# Patient Record
Sex: MALE | Race: WHITE | NOT HISPANIC OR LATINO | Employment: OTHER | URBAN - METROPOLITAN AREA
[De-identification: names, ages, dates, MRNs, and addresses within clinical notes are randomized per-mention and may not be internally consistent; named-entity substitution may affect disease eponyms.]

---

## 2021-04-06 ENCOUNTER — APPOINTMENT (OUTPATIENT)
Dept: RADIOLOGY | Facility: MEDICAL CENTER | Age: 76
DRG: 246 | End: 2021-04-06
Attending: HOSPITALIST
Payer: MEDICARE

## 2021-04-06 ENCOUNTER — APPOINTMENT (OUTPATIENT)
Dept: CARDIOLOGY | Facility: MEDICAL CENTER | Age: 76
DRG: 246 | End: 2021-04-06
Attending: INTERNAL MEDICINE
Payer: MEDICARE

## 2021-04-06 ENCOUNTER — APPOINTMENT (OUTPATIENT)
Dept: RADIOLOGY | Facility: MEDICAL CENTER | Age: 76
DRG: 246 | End: 2021-04-06
Attending: INTERNAL MEDICINE
Payer: MEDICARE

## 2021-04-06 ENCOUNTER — APPOINTMENT (OUTPATIENT)
Dept: CARDIOLOGY | Facility: MEDICAL CENTER | Age: 76
DRG: 246 | End: 2021-04-06
Attending: EMERGENCY MEDICINE
Payer: MEDICARE

## 2021-04-06 ENCOUNTER — HOSPITAL ENCOUNTER (INPATIENT)
Facility: MEDICAL CENTER | Age: 76
LOS: 4 days | DRG: 246 | End: 2021-04-10
Attending: EMERGENCY MEDICINE | Admitting: INTERNAL MEDICINE
Payer: MEDICARE

## 2021-04-06 ENCOUNTER — APPOINTMENT (OUTPATIENT)
Dept: RADIOLOGY | Facility: MEDICAL CENTER | Age: 76
DRG: 246 | End: 2021-04-06
Attending: EMERGENCY MEDICINE
Payer: MEDICARE

## 2021-04-06 DIAGNOSIS — I21.4 NSTEMI (NON-ST ELEVATED MYOCARDIAL INFARCTION) (HCC): ICD-10-CM

## 2021-04-06 PROBLEM — I46.9 CARDIAC ARREST (HCC): Status: ACTIVE | Noted: 2021-04-06

## 2021-04-06 PROBLEM — I48.91 AF (ATRIAL FIBRILLATION) (HCC): Status: ACTIVE | Noted: 2021-04-06

## 2021-04-06 PROBLEM — K92.2 UPPER GASTROINTESTINAL BLEED: Status: ACTIVE | Noted: 2021-04-06

## 2021-04-06 PROBLEM — R73.9 HYPERGLYCEMIA: Status: ACTIVE | Noted: 2021-04-06

## 2021-04-06 PROBLEM — E87.6 HYPOKALEMIA: Status: ACTIVE | Noted: 2021-04-06

## 2021-04-06 PROBLEM — J96.01 ACUTE RESPIRATORY FAILURE WITH HYPOXIA (HCC): Status: ACTIVE | Noted: 2021-04-06

## 2021-04-06 LAB
ABO + RH BLD: NORMAL
ABO GROUP BLD: NORMAL
ACTION RANGE TRIGGERED IACRT: YES
ALBUMIN SERPL BCP-MCNC: 3 G/DL (ref 3.2–4.9)
ALBUMIN SERPL BCP-MCNC: 3.1 G/DL (ref 3.2–4.9)
ALBUMIN/GLOB SERPL: 1.3 G/DL
ALBUMIN/GLOB SERPL: 1.4 G/DL
ALP SERPL-CCNC: 72 U/L (ref 30–99)
ALP SERPL-CCNC: 92 U/L (ref 30–99)
ALT SERPL-CCNC: 27 U/L (ref 2–50)
ALT SERPL-CCNC: 40 U/L (ref 2–50)
ANION GAP SERPL CALC-SCNC: 18 MMOL/L (ref 7–16)
ANION GAP SERPL CALC-SCNC: 6 MMOL/L (ref 7–16)
ANION GAP SERPL CALC-SCNC: 9 MMOL/L (ref 7–16)
APTT PPP: 37.5 SEC (ref 24.7–36)
AST SERPL-CCNC: 103 U/L (ref 12–45)
AST SERPL-CCNC: 25 U/L (ref 12–45)
BASE EXCESS BLDA CALC-SCNC: -2 MMOL/L (ref -4–3)
BASE EXCESS BLDA CALC-SCNC: -3 MMOL/L (ref -4–3)
BASE EXCESS BLDA CALC-SCNC: -7 MMOL/L (ref -4–3)
BASOPHILS # BLD AUTO: 0 % (ref 0–1.8)
BASOPHILS # BLD: 0 K/UL (ref 0–0.12)
BILIRUB SERPL-MCNC: 0.3 MG/DL (ref 0.1–1.5)
BILIRUB SERPL-MCNC: 0.6 MG/DL (ref 0.1–1.5)
BLD GP AB SCN SERPL QL: NORMAL
BODY TEMPERATURE: ABNORMAL DEGREES
BREATHS SETTING VENT: 26
BUN SERPL-MCNC: 12 MG/DL (ref 8–22)
BUN SERPL-MCNC: 14 MG/DL (ref 8–22)
BUN SERPL-MCNC: 16 MG/DL (ref 8–22)
CALCIUM SERPL-MCNC: 10.2 MG/DL (ref 8.5–10.5)
CALCIUM SERPL-MCNC: 8.1 MG/DL (ref 8.5–10.5)
CALCIUM SERPL-MCNC: 8.3 MG/DL (ref 8.5–10.5)
CHLORIDE SERPL-SCNC: 102 MMOL/L (ref 96–112)
CHLORIDE SERPL-SCNC: 109 MMOL/L (ref 96–112)
CHLORIDE SERPL-SCNC: 111 MMOL/L (ref 96–112)
CHOLEST SERPL-MCNC: 130 MG/DL (ref 100–199)
CO2 BLDA-SCNC: 22 MMOL/L (ref 20–33)
CO2 SERPL-SCNC: 18 MMOL/L (ref 20–33)
CO2 SERPL-SCNC: 20 MMOL/L (ref 20–33)
CO2 SERPL-SCNC: 23 MMOL/L (ref 20–33)
CREAT SERPL-MCNC: 1.06 MG/DL (ref 0.5–1.4)
CREAT SERPL-MCNC: 1.09 MG/DL (ref 0.5–1.4)
CREAT SERPL-MCNC: 1.16 MG/DL (ref 0.5–1.4)
DELSYS IDSYS: ABNORMAL
DELSYS IDSYS: ABNORMAL
EKG IMPRESSION: NORMAL
END TIDAL CARBON DIOXIDE IECO2: 21 MMHG
END TIDAL CARBON DIOXIDE IECO2: 23 MMHG
EOSINOPHIL # BLD AUTO: 0.23 K/UL (ref 0–0.51)
EOSINOPHIL NFR BLD: 3 % (ref 0–6.9)
ERYTHROCYTE [DISTWIDTH] IN BLOOD BY AUTOMATED COUNT: 47.7 FL (ref 35.9–50)
ERYTHROCYTE [DISTWIDTH] IN BLOOD BY AUTOMATED COUNT: 50.4 FL (ref 35.9–50)
FLUAV RNA SPEC QL NAA+PROBE: NEGATIVE
FLUBV RNA SPEC QL NAA+PROBE: NEGATIVE
GLOBULIN SER CALC-MCNC: 2.1 G/DL (ref 1.9–3.5)
GLOBULIN SER CALC-MCNC: 2.4 G/DL (ref 1.9–3.5)
GLUCOSE BLD-MCNC: 113 MG/DL (ref 65–99)
GLUCOSE BLD-MCNC: 148 MG/DL (ref 65–99)
GLUCOSE SERPL-MCNC: 124 MG/DL (ref 65–99)
GLUCOSE SERPL-MCNC: 224 MG/DL (ref 65–99)
GLUCOSE SERPL-MCNC: 99 MG/DL (ref 65–99)
HCO3 BLDA-SCNC: 20.7 MMOL/L (ref 17–25)
HCO3 BLDA-SCNC: 21.1 MMOL/L (ref 17–25)
HCO3 BLDA-SCNC: 21.3 MMOL/L (ref 17–25)
HCT VFR BLD AUTO: 35.6 % (ref 42–52)
HCT VFR BLD AUTO: 45.4 % (ref 42–52)
HDLC SERPL-MCNC: 41 MG/DL
HGB BLD-MCNC: 11.7 G/DL (ref 14–18)
HGB BLD-MCNC: 12 G/DL (ref 14–18)
HGB BLD-MCNC: 14.5 G/DL (ref 14–18)
HOROWITZ INDEX BLDA+IHG-RTO: 233 MM[HG]
HOROWITZ INDEX BLDA+IHG-RTO: 327 MM[HG]
HOROWITZ INDEX BLDA+IHG-RTO: 358 MM[HG]
INR PPP: 1.35 (ref 0.87–1.13)
INST. QUALIFIED PATIENT IIQPT: YES
LACTATE BLD-SCNC: 2.2 MMOL/L (ref 0.5–2)
LACTATE BLD-SCNC: 7 MMOL/L (ref 0.5–2)
LDLC SERPL CALC-MCNC: 75 MG/DL
LV EJECT FRACT  99904: 35
LYMPHOCYTES # BLD AUTO: 4.48 K/UL (ref 1–4.8)
LYMPHOCYTES NFR BLD: 59 % (ref 22–41)
MAGNESIUM SERPL-MCNC: 2.1 MG/DL (ref 1.5–2.5)
MANUAL DIFF BLD: NORMAL
MCH RBC QN AUTO: 30.6 PG (ref 27–33)
MCH RBC QN AUTO: 30.9 PG (ref 27–33)
MCHC RBC AUTO-ENTMCNC: 31.9 G/DL (ref 33.7–35.3)
MCHC RBC AUTO-ENTMCNC: 33.7 G/DL (ref 33.7–35.3)
MCV RBC AUTO: 90.8 FL (ref 81.4–97.8)
MCV RBC AUTO: 96.8 FL (ref 81.4–97.8)
MONOCYTES # BLD AUTO: 0.15 K/UL (ref 0–0.85)
MONOCYTES NFR BLD AUTO: 2 % (ref 0–13.4)
MORPHOLOGY BLD-IMP: NORMAL
NEUTROPHILS # BLD AUTO: 2.74 K/UL (ref 1.82–7.42)
NEUTROPHILS NFR BLD: 30 % (ref 44–72)
NEUTS BAND NFR BLD MANUAL: 6 % (ref 0–10)
NRBC # BLD AUTO: 0.13 K/UL
NRBC BLD-RTO: 1.7 /100 WBC
NT-PROBNP SERPL IA-MCNC: 53 PG/ML (ref 0–125)
O2/TOTAL GAS SETTING VFR VENT: 100 %
O2/TOTAL GAS SETTING VFR VENT: 30 %
O2/TOTAL GAS SETTING VFR VENT: 60 %
PCO2 BLDA: 27.4 MMHG (ref 26–37)
PCO2 BLDA: 28.8 MMHG (ref 26–37)
PCO2 BLDA: 51.4 MMHG (ref 26–37)
PCO2 TEMP ADJ BLDA: 25.7 MMHG (ref 26–37)
PCO2 TEMP ADJ BLDA: 26.3 MMHG (ref 26–37)
PCO2 TEMP ADJ BLDA: 47.1 MMHG (ref 26–37)
PEEP END EXPIRATORY PRESSURE IPEEP: 10 CMH20
PEEP END EXPIRATORY PRESSURE IPEEP: 8 CMH20
PH BLDA: 7.21 [PH] (ref 7.4–7.5)
PH BLDA: 7.47 [PH] (ref 7.4–7.5)
PH BLDA: 7.5 [PH] (ref 7.4–7.5)
PH TEMP ADJ BLDA: 7.24 [PH] (ref 7.4–7.5)
PH TEMP ADJ BLDA: 7.5 [PH] (ref 7.4–7.5)
PH TEMP ADJ BLDA: 7.52 [PH] (ref 7.4–7.5)
PLATELET # BLD AUTO: 166 K/UL (ref 164–446)
PLATELET # BLD AUTO: 177 K/UL (ref 164–446)
PLATELET BLD QL SMEAR: NORMAL
PMV BLD AUTO: 10.7 FL (ref 9–12.9)
PMV BLD AUTO: 11.5 FL (ref 9–12.9)
PO2 BLDA: 140 MMHG (ref 64–87)
PO2 BLDA: 358 MMHG (ref 64–87)
PO2 BLDA: 98 MMHG (ref 64–87)
PO2 TEMP ADJ BLDA: 127 MMHG (ref 64–87)
PO2 TEMP ADJ BLDA: 347 MMHG (ref 64–87)
PO2 TEMP ADJ BLDA: 89 MMHG (ref 64–87)
POTASSIUM SERPL-SCNC: 3.4 MMOL/L (ref 3.6–5.5)
POTASSIUM SERPL-SCNC: 4.3 MMOL/L (ref 3.6–5.5)
POTASSIUM SERPL-SCNC: 4.4 MMOL/L (ref 3.6–5.5)
POTASSIUM SERPL-SCNC: 4.4 MMOL/L (ref 3.6–5.5)
PROT SERPL-MCNC: 5.1 G/DL (ref 6–8.2)
PROT SERPL-MCNC: 5.5 G/DL (ref 6–8.2)
PROTHROMBIN TIME: 17.1 SEC (ref 12–14.6)
RBC # BLD AUTO: 3.92 M/UL (ref 4.7–6.1)
RBC # BLD AUTO: 4.69 M/UL (ref 4.7–6.1)
RBC BLD AUTO: NORMAL
RH BLD: NORMAL
RSV RNA SPEC QL NAA+PROBE: NEGATIVE
SAO2 % BLDA: 100 % (ref 93–99)
SAO2 % BLDA: 98 % (ref 93–99)
SAO2 % BLDA: 99 % (ref 93–99)
SARS-COV+SARS-COV-2 AG RESP QL IA.RAPID: NOTDETECTED
SARS-COV-2 RNA RESP QL NAA+PROBE: NOTDETECTED
SMUDGE CELLS BLD QL SMEAR: NORMAL
SODIUM SERPL-SCNC: 138 MMOL/L (ref 135–145)
SODIUM SERPL-SCNC: 138 MMOL/L (ref 135–145)
SODIUM SERPL-SCNC: 140 MMOL/L (ref 135–145)
SPECIMEN DRAWN FROM PATIENT: ABNORMAL
SPECIMEN SOURCE: NORMAL
SPECIMEN SOURCE: NORMAL
TIDAL VOLUME IVT: 430 ML
TIDAL VOLUME IVT: 430 ML
TRIGL SERPL-MCNC: 129 MG/DL (ref 0–149)
TRIGL SERPL-MCNC: 71 MG/DL (ref 0–149)
TROPONIN T SERPL-MCNC: 22 NG/L (ref 6–19)
TROPONIN T SERPL-MCNC: 82 NG/L (ref 6–19)
TROPONIN T SERPL-MCNC: 938 NG/L (ref 6–19)
UFH PPP CHRO-ACNC: <0.1 IU/ML
WBC # BLD AUTO: 7.6 K/UL (ref 4.8–10.8)
WBC # BLD AUTO: 9 K/UL (ref 4.8–10.8)

## 2021-04-06 PROCEDURE — 93005 ELECTROCARDIOGRAM TRACING: CPT | Performed by: INTERNAL MEDICINE

## 2021-04-06 PROCEDURE — 700105 HCHG RX REV CODE 258: Performed by: INTERNAL MEDICINE

## 2021-04-06 PROCEDURE — 5A1945Z RESPIRATORY VENTILATION, 24-96 CONSECUTIVE HOURS: ICD-10-PCS | Performed by: EMERGENCY MEDICINE

## 2021-04-06 PROCEDURE — 93005 ELECTROCARDIOGRAM TRACING: CPT | Performed by: EMERGENCY MEDICINE

## 2021-04-06 PROCEDURE — 0241U HCHG SARS-COV-2 COVID-19 NFCT DS RESP RNA 4 TRGT MIC: CPT

## 2021-04-06 PROCEDURE — 700105 HCHG RX REV CODE 258: Performed by: EMERGENCY MEDICINE

## 2021-04-06 PROCEDURE — 700102 HCHG RX REV CODE 250 W/ 637 OVERRIDE(OP): Performed by: HOSPITALIST

## 2021-04-06 PROCEDURE — 84132 ASSAY OF SERUM POTASSIUM: CPT

## 2021-04-06 PROCEDURE — A9270 NON-COVERED ITEM OR SERVICE: HCPCS | Performed by: HOSPITALIST

## 2021-04-06 PROCEDURE — 700101 HCHG RX REV CODE 250

## 2021-04-06 PROCEDURE — 700102 HCHG RX REV CODE 250 W/ 637 OVERRIDE(OP): Performed by: INTERNAL MEDICINE

## 2021-04-06 PROCEDURE — 86900 BLOOD TYPING SEROLOGIC ABO: CPT

## 2021-04-06 PROCEDURE — 93010 ELECTROCARDIOGRAM REPORT: CPT | Performed by: INTERNAL MEDICINE

## 2021-04-06 PROCEDURE — 36415 COLL VENOUS BLD VENIPUNCTURE: CPT

## 2021-04-06 PROCEDURE — 85730 THROMBOPLASTIN TIME PARTIAL: CPT

## 2021-04-06 PROCEDURE — 85018 HEMOGLOBIN: CPT

## 2021-04-06 PROCEDURE — C9803 HOPD COVID-19 SPEC COLLECT: HCPCS | Performed by: EMERGENCY MEDICINE

## 2021-04-06 PROCEDURE — C1751 CATH, INF, PER/CENT/MIDLINE: HCPCS

## 2021-04-06 PROCEDURE — 99291 CRITICAL CARE FIRST HOUR: CPT | Performed by: INTERNAL MEDICINE

## 2021-04-06 PROCEDURE — 71045 X-RAY EXAM CHEST 1 VIEW: CPT

## 2021-04-06 PROCEDURE — 93010 ELECTROCARDIOGRAM REPORT: CPT | Mod: 76 | Performed by: INTERNAL MEDICINE

## 2021-04-06 PROCEDURE — 93306 TTE W/DOPPLER COMPLETE: CPT | Mod: 26 | Performed by: INTERNAL MEDICINE

## 2021-04-06 PROCEDURE — 84484 ASSAY OF TROPONIN QUANT: CPT

## 2021-04-06 PROCEDURE — 80061 LIPID PANEL: CPT

## 2021-04-06 PROCEDURE — 700117 HCHG RX CONTRAST REV CODE 255: Performed by: HOSPITALIST

## 2021-04-06 PROCEDURE — A9270 NON-COVERED ITEM OR SERVICE: HCPCS | Performed by: INTERNAL MEDICINE

## 2021-04-06 PROCEDURE — 71275 CT ANGIOGRAPHY CHEST: CPT | Mod: ME

## 2021-04-06 PROCEDURE — 96375 TX/PRO/DX INJ NEW DRUG ADDON: CPT

## 2021-04-06 PROCEDURE — 4A023N7 MEASUREMENT OF CARDIAC SAMPLING AND PRESSURE, LEFT HEART, PERCUTANEOUS APPROACH: ICD-10-PCS | Performed by: INTERNAL MEDICINE

## 2021-04-06 PROCEDURE — 302214 INTUBATION BOX: Performed by: HOSPITALIST

## 2021-04-06 PROCEDURE — 99291 CRITICAL CARE FIRST HOUR: CPT | Mod: 25 | Performed by: INTERNAL MEDICINE

## 2021-04-06 PROCEDURE — 700111 HCHG RX REV CODE 636 W/ 250 OVERRIDE (IP)

## 2021-04-06 PROCEDURE — 5A12012 PERFORMANCE OF CARDIAC OUTPUT, SINGLE, MANUAL: ICD-10-PCS | Performed by: EMERGENCY MEDICINE

## 2021-04-06 PROCEDURE — 93005 ELECTROCARDIOGRAM TRACING: CPT

## 2021-04-06 PROCEDURE — 36556 INSERT NON-TUNNEL CV CATH: CPT | Mod: RT | Performed by: INTERNAL MEDICINE

## 2021-04-06 PROCEDURE — 31500 INSERT EMERGENCY AIRWAY: CPT

## 2021-04-06 PROCEDURE — 85007 BL SMEAR W/DIFF WBC COUNT: CPT

## 2021-04-06 PROCEDURE — C9113 INJ PANTOPRAZOLE SODIUM, VIA: HCPCS | Performed by: HOSPITALIST

## 2021-04-06 PROCEDURE — 700111 HCHG RX REV CODE 636 W/ 250 OVERRIDE (IP): Performed by: EMERGENCY MEDICINE

## 2021-04-06 PROCEDURE — B240ZZ3 ULTRASONOGRAPHY OF SINGLE CORONARY ARTERY, INTRAVASCULAR: ICD-10-PCS | Performed by: INTERNAL MEDICINE

## 2021-04-06 PROCEDURE — 85520 HEPARIN ASSAY: CPT

## 2021-04-06 PROCEDURE — 770022 HCHG ROOM/CARE - ICU (200)

## 2021-04-06 PROCEDURE — 700102 HCHG RX REV CODE 250 W/ 637 OVERRIDE(OP)

## 2021-04-06 PROCEDURE — 700111 HCHG RX REV CODE 636 W/ 250 OVERRIDE (IP): Performed by: INTERNAL MEDICINE

## 2021-04-06 PROCEDURE — 83735 ASSAY OF MAGNESIUM: CPT

## 2021-04-06 PROCEDURE — A9270 NON-COVERED ITEM OR SERVICE: HCPCS

## 2021-04-06 PROCEDURE — 99291 CRITICAL CARE FIRST HOUR: CPT

## 2021-04-06 PROCEDURE — 94003 VENT MGMT INPAT SUBQ DAY: CPT

## 2021-04-06 PROCEDURE — 82803 BLOOD GASES ANY COMBINATION: CPT | Mod: 91

## 2021-04-06 PROCEDURE — 700117 HCHG RX CONTRAST REV CODE 255: Performed by: INTERNAL MEDICINE

## 2021-04-06 PROCEDURE — 85347 COAGULATION TIME ACTIVATED: CPT | Mod: 91

## 2021-04-06 PROCEDURE — 83605 ASSAY OF LACTIC ACID: CPT

## 2021-04-06 PROCEDURE — 84478 ASSAY OF TRIGLYCERIDES: CPT

## 2021-04-06 PROCEDURE — 80053 COMPREHEN METABOLIC PANEL: CPT

## 2021-04-06 PROCEDURE — 85610 PROTHROMBIN TIME: CPT

## 2021-04-06 PROCEDURE — 86901 BLOOD TYPING SEROLOGIC RH(D): CPT

## 2021-04-06 PROCEDURE — 99291 CRITICAL CARE FIRST HOUR: CPT | Performed by: HOSPITALIST

## 2021-04-06 PROCEDURE — 83880 ASSAY OF NATRIURETIC PEPTIDE: CPT

## 2021-04-06 PROCEDURE — 99153 MOD SED SAME PHYS/QHP EA: CPT

## 2021-04-06 PROCEDURE — 02HV33Z INSERTION OF INFUSION DEVICE INTO SUPERIOR VENA CAVA, PERCUTANEOUS APPROACH: ICD-10-PCS | Performed by: INTERNAL MEDICINE

## 2021-04-06 PROCEDURE — 94002 VENT MGMT INPAT INIT DAY: CPT

## 2021-04-06 PROCEDURE — 36600 WITHDRAWAL OF ARTERIAL BLOOD: CPT

## 2021-04-06 PROCEDURE — 92978 ENDOLUMINL IVUS OCT C 1ST: CPT | Mod: 26,LD | Performed by: INTERNAL MEDICINE

## 2021-04-06 PROCEDURE — 92941 PRQ TRLML REVSC TOT OCCL AMI: CPT | Mod: LD | Performed by: INTERNAL MEDICINE

## 2021-04-06 PROCEDURE — 85027 COMPLETE CBC AUTOMATED: CPT | Mod: 91

## 2021-04-06 PROCEDURE — 93306 TTE W/DOPPLER COMPLETE: CPT

## 2021-04-06 PROCEDURE — 70450 CT HEAD/BRAIN W/O DYE: CPT | Mod: MG

## 2021-04-06 PROCEDURE — 36556 INSERT NON-TUNNEL CV CATH: CPT

## 2021-04-06 PROCEDURE — 92950 HEART/LUNG RESUSCITATION CPR: CPT

## 2021-04-06 PROCEDURE — B2151ZZ FLUOROSCOPY OF LEFT HEART USING LOW OSMOLAR CONTRAST: ICD-10-PCS | Performed by: INTERNAL MEDICINE

## 2021-04-06 PROCEDURE — 94799 UNLISTED PULMONARY SVC/PX: CPT

## 2021-04-06 PROCEDURE — 99292 CRITICAL CARE ADDL 30 MIN: CPT | Mod: 25 | Performed by: INTERNAL MEDICINE

## 2021-04-06 PROCEDURE — 0BH18EZ INSERTION OF ENDOTRACHEAL AIRWAY INTO TRACHEA, VIA NATURAL OR ARTIFICIAL OPENING ENDOSCOPIC: ICD-10-PCS | Performed by: EMERGENCY MEDICINE

## 2021-04-06 PROCEDURE — 92929 PR PRQ TRLUML CORONARY STENT W/ANGIO ADDL ART/BRNCH: CPT | Mod: LD | Performed by: INTERNAL MEDICINE

## 2021-04-06 PROCEDURE — 700111 HCHG RX REV CODE 636 W/ 250 OVERRIDE (IP): Performed by: HOSPITALIST

## 2021-04-06 PROCEDURE — 99152 MOD SED SAME PHYS/QHP 5/>YRS: CPT | Performed by: INTERNAL MEDICINE

## 2021-04-06 PROCEDURE — 93458 L HRT ARTERY/VENTRICLE ANGIO: CPT | Mod: 26,59 | Performed by: INTERNAL MEDICINE

## 2021-04-06 PROCEDURE — 82962 GLUCOSE BLOOD TEST: CPT | Mod: 91

## 2021-04-06 PROCEDURE — 37195 THROMBOLYTIC THERAPY STROKE: CPT

## 2021-04-06 PROCEDURE — 96365 THER/PROPH/DIAG IV INF INIT: CPT

## 2021-04-06 PROCEDURE — 87426 SARSCOV CORONAVIRUS AG IA: CPT

## 2021-04-06 PROCEDURE — 86850 RBC ANTIBODY SCREEN: CPT

## 2021-04-06 PROCEDURE — 87040 BLOOD CULTURE FOR BACTERIA: CPT

## 2021-04-06 PROCEDURE — B2111ZZ FLUOROSCOPY OF MULTIPLE CORONARY ARTERIES USING LOW OSMOLAR CONTRAST: ICD-10-PCS | Performed by: INTERNAL MEDICINE

## 2021-04-06 PROCEDURE — 3E03317 INTRODUCTION OF OTHER THROMBOLYTIC INTO PERIPHERAL VEIN, PERCUTANEOUS APPROACH: ICD-10-PCS | Performed by: EMERGENCY MEDICINE

## 2021-04-06 PROCEDURE — 80048 BASIC METABOLIC PNL TOTAL CA: CPT

## 2021-04-06 PROCEDURE — 027135Z DILATION OF CORONARY ARTERY, TWO ARTERIES WITH TWO DRUG-ELUTING INTRALUMINAL DEVICES, PERCUTANEOUS APPROACH: ICD-10-PCS | Performed by: INTERNAL MEDICINE

## 2021-04-06 RX ORDER — CLOPIDOGREL BISULFATE 75 MG/1
75 TABLET ORAL DAILY
Status: DISCONTINUED | OUTPATIENT
Start: 2021-04-07 | End: 2021-04-07

## 2021-04-06 RX ORDER — CLOPIDOGREL BISULFATE 75 MG/1
TABLET ORAL
Status: COMPLETED
Start: 2021-04-06 | End: 2021-04-06

## 2021-04-06 RX ORDER — SODIUM CHLORIDE 9 MG/ML
INJECTION, SOLUTION INTRAVENOUS CONTINUOUS
Status: DISCONTINUED | OUTPATIENT
Start: 2021-04-06 | End: 2021-04-08

## 2021-04-06 RX ORDER — ACETAMINOPHEN 325 MG/1
650 TABLET ORAL EVERY 6 HOURS PRN
Status: DISCONTINUED | OUTPATIENT
Start: 2021-04-06 | End: 2021-04-10 | Stop reason: HOSPADM

## 2021-04-06 RX ORDER — POLYETHYLENE GLYCOL 3350 17 G/17G
1 POWDER, FOR SOLUTION ORAL
Status: DISCONTINUED | OUTPATIENT
Start: 2021-04-06 | End: 2021-04-10 | Stop reason: HOSPADM

## 2021-04-06 RX ORDER — ASPIRIN 81 MG/1
81 TABLET, CHEWABLE ORAL DAILY
Status: DISCONTINUED | OUTPATIENT
Start: 2021-04-07 | End: 2021-04-10 | Stop reason: HOSPADM

## 2021-04-06 RX ORDER — DEXTROSE MONOHYDRATE 25 G/50ML
50 INJECTION, SOLUTION INTRAVENOUS
Status: DISCONTINUED | OUTPATIENT
Start: 2021-04-06 | End: 2021-04-09

## 2021-04-06 RX ORDER — EPINEPHRINE IN SOD CHLOR,ISO 1 MG/10 ML
1 SYRINGE (ML) INTRAVENOUS ONCE
Status: COMPLETED | OUTPATIENT
Start: 2021-04-06 | End: 2021-04-06

## 2021-04-06 RX ORDER — SODIUM CHLORIDE 9 MG/ML
1000 INJECTION, SOLUTION INTRAVENOUS ONCE
Status: COMPLETED | OUTPATIENT
Start: 2021-04-06 | End: 2021-04-06

## 2021-04-06 RX ORDER — HEPARIN SODIUM 1000 [USP'U]/ML
4000 INJECTION, SOLUTION INTRAVENOUS; SUBCUTANEOUS ONCE
Status: DISCONTINUED | OUTPATIENT
Start: 2021-04-06 | End: 2021-04-06

## 2021-04-06 RX ORDER — ASPIRIN 300 MG/1
SUPPOSITORY RECTAL
Status: COMPLETED
Start: 2021-04-06 | End: 2021-04-06

## 2021-04-06 RX ORDER — ATORVASTATIN CALCIUM 40 MG/1
40 TABLET, FILM COATED ORAL EVERY EVENING
Status: DISCONTINUED | OUTPATIENT
Start: 2021-04-06 | End: 2021-04-10 | Stop reason: HOSPADM

## 2021-04-06 RX ORDER — HEPARIN SODIUM 5000 [USP'U]/100ML
0-30 INJECTION, SOLUTION INTRAVENOUS CONTINUOUS
Status: DISCONTINUED | OUTPATIENT
Start: 2021-04-06 | End: 2021-04-06

## 2021-04-06 RX ORDER — METOCLOPRAMIDE HYDROCHLORIDE 5 MG/ML
10 INJECTION INTRAMUSCULAR; INTRAVENOUS ONCE
Status: DISCONTINUED | OUTPATIENT
Start: 2021-04-06 | End: 2021-04-06

## 2021-04-06 RX ORDER — EPINEPHRINE IN SOD CHLOR,ISO 1 MG/10 ML
SYRINGE (ML) INTRAVENOUS
Status: COMPLETED
Start: 2021-04-06 | End: 2021-04-06

## 2021-04-06 RX ORDER — HEPARIN SODIUM 1000 [USP'U]/ML
INJECTION, SOLUTION INTRAVENOUS; SUBCUTANEOUS
Status: COMPLETED
Start: 2021-04-06 | End: 2021-04-06

## 2021-04-06 RX ORDER — HYDROMORPHONE HYDROCHLORIDE 1 MG/ML
.5-2 INJECTION, SOLUTION INTRAMUSCULAR; INTRAVENOUS; SUBCUTANEOUS
Status: DISCONTINUED | OUTPATIENT
Start: 2021-04-06 | End: 2021-04-08

## 2021-04-06 RX ORDER — ONDANSETRON 2 MG/ML
4 INJECTION INTRAMUSCULAR; INTRAVENOUS EVERY 4 HOURS PRN
Status: DISCONTINUED | OUTPATIENT
Start: 2021-04-06 | End: 2021-04-10 | Stop reason: HOSPADM

## 2021-04-06 RX ORDER — IPRATROPIUM BROMIDE AND ALBUTEROL SULFATE 2.5; .5 MG/3ML; MG/3ML
3 SOLUTION RESPIRATORY (INHALATION)
Status: DISCONTINUED | OUTPATIENT
Start: 2021-04-06 | End: 2021-04-10 | Stop reason: HOSPADM

## 2021-04-06 RX ORDER — MIDAZOLAM HYDROCHLORIDE 1 MG/ML
INJECTION INTRAMUSCULAR; INTRAVENOUS
Status: COMPLETED
Start: 2021-04-06 | End: 2021-04-06

## 2021-04-06 RX ORDER — DIPHENHYDRAMINE HYDROCHLORIDE 50 MG/ML
25 INJECTION INTRAMUSCULAR; INTRAVENOUS ONCE
Status: DISCONTINUED | OUTPATIENT
Start: 2021-04-06 | End: 2021-04-06

## 2021-04-06 RX ORDER — AMIODARONE HYDROCHLORIDE 150 MG/3ML
300 INJECTION, SOLUTION INTRAVENOUS ONCE
Status: COMPLETED | OUTPATIENT
Start: 2021-04-06 | End: 2021-04-06

## 2021-04-06 RX ORDER — ONDANSETRON 4 MG/1
4 TABLET, ORALLY DISINTEGRATING ORAL EVERY 4 HOURS PRN
Status: DISCONTINUED | OUTPATIENT
Start: 2021-04-06 | End: 2021-04-10 | Stop reason: HOSPADM

## 2021-04-06 RX ORDER — HEPARIN SODIUM 1000 [USP'U]/ML
2000 INJECTION, SOLUTION INTRAVENOUS; SUBCUTANEOUS PRN
Status: DISCONTINUED | OUTPATIENT
Start: 2021-04-06 | End: 2021-04-06

## 2021-04-06 RX ORDER — POTASSIUM CHLORIDE 14.9 MG/ML
20 INJECTION INTRAVENOUS ONCE
Status: COMPLETED | OUTPATIENT
Start: 2021-04-06 | End: 2021-04-06

## 2021-04-06 RX ORDER — AMOXICILLIN 250 MG
2 CAPSULE ORAL 2 TIMES DAILY
Status: DISCONTINUED | OUTPATIENT
Start: 2021-04-06 | End: 2021-04-10 | Stop reason: HOSPADM

## 2021-04-06 RX ORDER — HEPARIN SODIUM 200 [USP'U]/100ML
INJECTION, SOLUTION INTRAVENOUS
Status: COMPLETED
Start: 2021-04-06 | End: 2021-04-06

## 2021-04-06 RX ORDER — PANTOPRAZOLE SODIUM 40 MG/10ML
40 INJECTION, POWDER, LYOPHILIZED, FOR SOLUTION INTRAVENOUS 2 TIMES DAILY
Status: DISCONTINUED | OUTPATIENT
Start: 2021-04-06 | End: 2021-04-09

## 2021-04-06 RX ORDER — SODIUM CHLORIDE 9 MG/ML
2000 INJECTION, SOLUTION INTRAVENOUS ONCE
Status: COMPLETED | OUTPATIENT
Start: 2021-04-06 | End: 2021-04-06

## 2021-04-06 RX ORDER — NOREPINEPHRINE BITARTRATE 0.03 MG/ML
0-30 INJECTION, SOLUTION INTRAVENOUS CONTINUOUS
Status: DISCONTINUED | OUTPATIENT
Start: 2021-04-06 | End: 2021-04-09

## 2021-04-06 RX ORDER — VERAPAMIL HYDROCHLORIDE 2.5 MG/ML
INJECTION, SOLUTION INTRAVENOUS
Status: COMPLETED
Start: 2021-04-06 | End: 2021-04-06

## 2021-04-06 RX ORDER — BISACODYL 10 MG
10 SUPPOSITORY, RECTAL RECTAL
Status: DISCONTINUED | OUTPATIENT
Start: 2021-04-06 | End: 2021-04-10 | Stop reason: HOSPADM

## 2021-04-06 RX ORDER — LIDOCAINE HYDROCHLORIDE 20 MG/ML
INJECTION, SOLUTION INFILTRATION; PERINEURAL
Status: COMPLETED
Start: 2021-04-06 | End: 2021-04-06

## 2021-04-06 RX ORDER — SODIUM CHLORIDE 9 MG/ML
INJECTION, SOLUTION INTRAVENOUS CONTINUOUS
Status: DISCONTINUED | OUTPATIENT
Start: 2021-04-06 | End: 2021-04-06

## 2021-04-06 RX ORDER — CALCIUM CHLORIDE 100 MG/ML
1 INJECTION INTRAVENOUS; INTRAVENTRICULAR ONCE
Status: COMPLETED | OUTPATIENT
Start: 2021-04-06 | End: 2021-04-06

## 2021-04-06 RX ADMIN — LIDOCAINE HYDROCHLORIDE: 20 INJECTION, SOLUTION INFILTRATION; PERINEURAL at 15:29

## 2021-04-06 RX ADMIN — IOHEXOL 90 ML: 350 INJECTION, SOLUTION INTRAVENOUS at 10:15

## 2021-04-06 RX ADMIN — PANTOPRAZOLE SODIUM 40 MG: 40 INJECTION, POWDER, FOR SOLUTION INTRAVENOUS at 11:49

## 2021-04-06 RX ADMIN — EPINEPHRINE 1 MG: 0.1 INJECTION, SOLUTION ENDOTRACHEAL; INTRACARDIAC; INTRAVENOUS at 07:54

## 2021-04-06 RX ADMIN — HEPARIN SODIUM: 200 INJECTION, SOLUTION INTRAVENOUS at 15:15

## 2021-04-06 RX ADMIN — EPINEPHRINE 1 MG: 0.1 INJECTION, SOLUTION ENDOTRACHEAL; INTRACARDIAC; INTRAVENOUS at 07:42

## 2021-04-06 RX ADMIN — VERAPAMIL HYDROCHLORIDE 2.5 MG: 2.5 INJECTION, SOLUTION INTRAVENOUS at 15:29

## 2021-04-06 RX ADMIN — AMIODARONE HYDROCHLORIDE 1 MG/MIN: 1.8 INJECTION, SOLUTION INTRAVENOUS at 10:20

## 2021-04-06 RX ADMIN — DOCUSATE SODIUM 50 MG AND SENNOSIDES 8.6 MG 2 TABLET: 8.6; 5 TABLET, FILM COATED ORAL at 18:47

## 2021-04-06 RX ADMIN — HEPARIN SODIUM: 1000 INJECTION, SOLUTION INTRAVENOUS; SUBCUTANEOUS at 16:32

## 2021-04-06 RX ADMIN — AMIODARONE HYDROCHLORIDE 300 MG: 50 INJECTION, SOLUTION INTRAVENOUS at 07:48

## 2021-04-06 RX ADMIN — EPINEPHRINE 1 MG: 0.1 INJECTION, SOLUTION ENDOTRACHEAL; INTRACARDIAC; INTRAVENOUS at 07:58

## 2021-04-06 RX ADMIN — SODIUM CHLORIDE: 9 INJECTION, SOLUTION INTRAVENOUS at 12:50

## 2021-04-06 RX ADMIN — CLOPIDOGREL BISULFATE 75 MG: 75 TABLET ORAL at 16:34

## 2021-04-06 RX ADMIN — HUMAN ALBUMIN MICROSPHERES AND PERFLUTREN 3 ML: 10; .22 INJECTION, SOLUTION INTRAVENOUS at 09:08

## 2021-04-06 RX ADMIN — SODIUM BICARBONATE 50 MEQ: 84 INJECTION INTRAVENOUS at 08:00

## 2021-04-06 RX ADMIN — CALCIUM CHLORIDE 1 G: 100 INJECTION INTRAVENOUS; INTRAVENTRICULAR at 07:49

## 2021-04-06 RX ADMIN — NITROGLYCERIN 10 ML: 20 INJECTION INTRAVENOUS at 15:29

## 2021-04-06 RX ADMIN — SODIUM CHLORIDE 1000 ML: 9 INJECTION, SOLUTION INTRAVENOUS at 09:00

## 2021-04-06 RX ADMIN — SODIUM BICARBONATE 50 MEQ: 84 INJECTION INTRAVENOUS at 07:50

## 2021-04-06 RX ADMIN — PROPOFOL 65 MCG/KG/MIN: 10 INJECTION, EMULSION INTRAVENOUS at 12:10

## 2021-04-06 RX ADMIN — SODIUM CHLORIDE 2000 ML: 9 INJECTION, SOLUTION INTRAVENOUS at 07:45

## 2021-04-06 RX ADMIN — EPINEPHRINE 1 MG: 0.1 INJECTION, SOLUTION ENDOTRACHEAL; INTRACARDIAC; INTRAVENOUS at 07:51

## 2021-04-06 RX ADMIN — MIDAZOLAM HYDROCHLORIDE 2 MG: 1 INJECTION, SOLUTION INTRAMUSCULAR; INTRAVENOUS at 15:29

## 2021-04-06 RX ADMIN — HYDROMORPHONE HYDROCHLORIDE 1 MG: 1 INJECTION, SOLUTION INTRAMUSCULAR; INTRAVENOUS; SUBCUTANEOUS at 08:43

## 2021-04-06 RX ADMIN — PROPOFOL 10 MCG/KG/MIN: 10 INJECTION, EMULSION INTRAVENOUS at 09:11

## 2021-04-06 RX ADMIN — HEPARIN SODIUM: 1000 INJECTION, SOLUTION INTRAVENOUS; SUBCUTANEOUS at 15:25

## 2021-04-06 RX ADMIN — AMIODARONE HYDROCHLORIDE 0.5 MG/MIN: 1.8 INJECTION, SOLUTION INTRAVENOUS at 16:58

## 2021-04-06 RX ADMIN — ATORVASTATIN CALCIUM 40 MG: 40 TABLET, FILM COATED ORAL at 18:47

## 2021-04-06 RX ADMIN — PROPOFOL 50 MCG/KG/MIN: 10 INJECTION, EMULSION INTRAVENOUS at 21:08

## 2021-04-06 RX ADMIN — HYDROMORPHONE HYDROCHLORIDE 1 MG: 1 INJECTION, SOLUTION INTRAMUSCULAR; INTRAVENOUS; SUBCUTANEOUS at 10:22

## 2021-04-06 RX ADMIN — PANTOPRAZOLE SODIUM 40 MG: 40 INJECTION, POWDER, FOR SOLUTION INTRAVENOUS at 18:47

## 2021-04-06 RX ADMIN — POTASSIUM CHLORIDE 20 MEQ: 14.9 INJECTION, SOLUTION INTRAVENOUS at 12:36

## 2021-04-06 RX ADMIN — PROPOFOL 50 MCG/KG/MIN: 10 INJECTION, EMULSION INTRAVENOUS at 16:23

## 2021-04-06 ASSESSMENT — PAIN DESCRIPTION - PAIN TYPE
TYPE: ACUTE PAIN

## 2021-04-06 ASSESSMENT — FIBROSIS 4 INDEX: FIB4 SCORE: 2.09

## 2021-04-06 NOTE — ASSESSMENT & PLAN NOTE
V. fib VT arrest  Initial echo does show some wall motion abnormalities.  Repeat EKG demonstrates a resolution of the patient's.  Right bundle branch block which was noted on presentation.  CTA chest neg though this was after pt had been given tPA  L heart cath confirms L main disease now s/p PCI and CHARO

## 2021-04-06 NOTE — ED NOTES
CIC RN Haley at bedside, Report given, POC discussed.  Pt transferred to CT with 2 RN, and RT on monitor.  All belongings including wallets, clothes, and shoes transferred with pt to room.

## 2021-04-06 NOTE — H&P
Hospital Medicine History & Physical Note    Date of Service  4/6/2021    Primary Care Physician  No primary care provider on file.    Consultants  Cardiology    Code Status  Full Code    Chief Complaint  Chief Complaint   Patient presents with   • Chest Pain     intermittenly for 3 days, consistant past 3 hours.   • Shortness of Breath     for 3 hours       History of Presenting Illness  76 y.o. male who presented 4/6/2021.  History is obtained through discussion with ED personnel, and review of chart.  Patient himself is intubated and unable to provide further information.  Patient was brought in by ambulance.  Apparently had called them due to complaint of chest pain.  On scene he was ambulatory and oriented.  He was given aspirin and 1 sublingual nitro.  An EKG was done which demonstrated some ST segment depression which apparently improved after aspirin.  In the ED the patient was initially alert oriented and interactive, but once he went into the red pod, he became unresponsive and went into a V. tach followed by V. fib arrest.  Patient underwent CPR for approximately 25 minutes.  He was given multiple courses of epinephrine, 1 g of calcium, 2 courses of bicarb, amiodarone bolus, defibrillated, eventually was given TPA, with ROSC.  During the code the patient was intubated which was apparently a nontraumatic intubation.  An orogastric tube has since been placed, and he has returned bright red blood.  After ROSC, patient had systolic blood pressure around 100-1 30, this became hypotensive after initiation of propofol.  He has since been started on Levophed.    Critical care time 40 minutes.  I spent that time at the bedside and acutely worsening of the patient's VF VT arrest and stabilizing his hemodynamics prior to transfer to the ICU.    Review of Systems  Review of Systems   Unable to perform ROS: Acuity of condition       Past Medical History   has no past medical history on file.    Surgical History   has  no past surgical history on file.     Family History  family history is not on file.     Social History       Allergies  No Known Allergies    Medications  None       Physical Exam  Temp:  [36.1 °C (96.9 °F)] 36.1 °C (96.9 °F)  Pulse:  [] 93  Resp:  [14-32] 32  BP: ()/() 128/63  SpO2:  [42 %-100 %] 100 %    Physical Exam  Constitutional:       General: He is not in acute distress.     Appearance: He is well-developed. He is not diaphoretic.   HENT:      Head: Normocephalic and atraumatic.   Eyes:      Conjunctiva/sclera: Conjunctivae normal.   Neck:      Vascular: No JVD.   Cardiovascular:      Rate and Rhythm: Normal rate.      Heart sounds: Murmur present. No gallop.    Pulmonary:      Effort: Pulmonary effort is normal. No respiratory distress.      Breath sounds: No stridor. Rales present. No wheezing.   Abdominal:      Palpations: Abdomen is soft.      Tenderness: There is no abdominal tenderness. There is no guarding or rebound.   Musculoskeletal:      Right lower leg: No edema.      Left lower leg: No edema.   Skin:     General: Skin is warm and dry.      Findings: No rash.   Neurological:      Comments: Patient examined on the vent, he has not been given any sedation or paralytics.  Pupils are midpoint and reactive symmetric bilaterally.  Patient is moving all 4 extremities purposefully.  He does not attend or follow.         Laboratory:  Recent Labs     04/06/21  0759   WBC 7.6   RBC 4.69*   HEMOGLOBIN 14.5   HEMATOCRIT 45.4   MCV 96.8   MCH 30.9   MCHC 31.9*   RDW 50.4*   PLATELETCT 177   MPV 10.7     Recent Labs     04/06/21  0759   SODIUM 138   POTASSIUM 3.4*   CHLORIDE 102   CO2 18*   GLUCOSE 224*   BUN 14   CREATININE 1.16   CALCIUM 10.2     Recent Labs     04/06/21  0759   ALTSGPT 27   ASTSGOT 25   ALKPHOSPHAT 92   TBILIRUBIN 0.3   GLUCOSE 224*     Recent Labs     04/06/21  0810   APTT 37.5*   INR 1.35*     No results for input(s): NTPROBNP in the last 72 hours.  Recent Labs      04/06/21  0902   TRIGLYCERIDE 129     Recent Labs     04/06/21  0759 04/06/21  0902   TROPONINT 22* 82*       Imaging:  DX-CHEST-PORTABLE (1 VIEW)   Final Result      1.  Right internal jugular catheter has been placed and appears appropriately located      2.  No pneumothorax      3.  Pre-existing endotracheal tube and enteric catheter appear appropriately located      4.  Mild atelectasis      CT-CTA CHEST PULMONARY ARTERY W/ RECONS   Final Result      1.  Negative for pulmonary embolism      2.  Mild atelectasis      3.  endotracheal tube and enteric catheter appear appropriately located      4.  Aortic and coronary atherosclerotic plaque            CT-HEAD W/O   Final Result      1.  No acute intracranial abnormality.   2.  Mild cortical atrophy.   3.  Chronic paranasal sinus disease.      EC-ECHOCARDIOGRAM COMPLETE W/ CONT   Final Result      DX-CHEST-PORTABLE (1 VIEW)   Final Result      1.  Endotracheal tube tip 7 cm below the elijah.   2.  Orogastric tube tip at the GE junction level.   3.  No pneumonia or pneumothorax.            Assessment/Plan:  I anticipate this patient will require at least two midnights for appropriate medical management, necessitating inpatient admission.    Acute respiratory failure with hypoxia (HCC)  Assessment & Plan  Intubated for airway protection after VF VT arrest  Pulmonology following    Cardiac arrest (HCC)  Assessment & Plan  V. fib VT arrest  Based on the work-up to this point this is likely a primary cardiac event that we are still in the process of evaluating.  Initial echo does show some wall motion abnormalities.  Repeat EKG demonstrates a resolution of the patient's.  Right bundle branch block which was noted on presentation.  Giving aspirin and statin  Holding beta-blocker in the setting of hypotension  Consult cardiology  Patient is s/p TPA therefore we are not giving heparin  Follow serial troponins  CTA of the chest negative for PE  We will need further ischemic  work-up once patient is medically stabilized    Hyperglycemia  Assessment & Plan  Unknown if the patient carries a history of diabetes as we have an incomplete medical record.  Check A1c  Placed on sliding scale    Upper gastrointestinal bleed  Assessment & Plan  Etiology is uncertain.  Intubation was apparently not traumatic.  Is complicated by the fact that he was given TPA during the code.  No stigmata of liver disease, coags are unremarkable.  Serial H&H  IV Protonix      AF (atrial fibrillation) (HCC)  Assessment & Plan  Unknown if this is a new diagnosis.  [facility]  Acutely s/p TPA  Coags are unremarkable  Official interpretation of the echo pending  Checking TSH and free T4  Cardiology consulted    Hypokalemia  Assessment & Plan  Potassium of 3.4 was drawn after administration of beta adrenergic's, calcium, and bicarb.  Repeat BMP and replace as appropriate

## 2021-04-06 NOTE — ASSESSMENT & PLAN NOTE
Ventricular fibrillation cardiac arrest in ED  ROSC with CPR, epinephrine, amiodarone, calcium, bicarbonate solution  Received alteplase in ED for possible PE  Found to have CAD and underwent PCI to LAD and diagonal  Continue DAPT, statin, cardiology following, EF 35%, further follow-up for residual CAD, myocardial function

## 2021-04-06 NOTE — CARE PLAN
Problem: Communication  Goal: The ability to communicate needs accurately and effectively will improve  Outcome: PROGRESSING AS EXPECTED  Interventions: plan of care discussed, care explained to patient, patient unable to verbalize understanding at this time.      Problem: Safety  Goal: Will remain free from injury  Outcome: PROGRESSING AS EXPECTED  Interventions: patient oriented to room and surroundings, bed alarm on, restraints in place, order verified, hourly rounds in place.      Problem: Skin Integrity  Goal: Risk for impaired skin integrity will decrease  Outcome: PROGRESSING AS EXPECTED  Interventions: pillows in use for support, skin under medical devices assessed, moisture barriers in use, q 2 hour turns

## 2021-04-06 NOTE — DISCHARGE PLANNING
Care Transition Team Discharge Planning    Anticipated Discharge Disposition: TBD    Action: Per afternoon rounds w/ hospitalist, pt is now vented. Lsw updated MD per phone contact earlier this AM. ER Lsw indicates all [facility] and [facility] information was [facility]. Pt apparently has a possible sister in [facility] information collected:  pt's only contact/possible [facility] listed is Maximo Joyce [phone]     Barriers to Discharge: TBD    Plan: LSw will continue to follow and assist w/ d/c planning.

## 2021-04-06 NOTE — ASSESSMENT & PLAN NOTE
In setting of VF/VT arrest and tPA administration  Etiology is uncertain.  Intubation was apparently not traumatic.  Bleeding has apparently resolved  Cont po PPI  Is tolerating DAPT for his CAD  Follow daily CBC

## 2021-04-06 NOTE — PROGRESS NOTES
Patient transported from ED to CT to T632. patient rass 4 Medications titrated per MD Rob order. VSS. CPOT neg for pain.

## 2021-04-06 NOTE — ED PROVIDER NOTES
ED Provider Note    Scribed for Yolanda Pink M.D. by Daisy Dixon. 4/6/2021, 7:43 AM.    Primary care provider: None  Means of arrival: EMS  History obtained from: EMS  History limited by: critical and unresponsive status.    CHIEF COMPLAINT  Chief Complaint   Patient presents with    Chest Pain     intermittenly for 3 days, consistant past 3 hours.    Shortness of Breath     for 3 hours       HPI  Ian Wall is a 76 y.o. male who presents to the Emergency Department for chest pain. Patient has been having intermittent chest pain and shortness of breath for the last 3 days, with an episode of sharp and intense chest pain onset 3 hours ago. The patient was sinus rhythm for EMS and alert upon arrival. Nursing states the patient was alert when entering the room, before having an episode of sharp chest pain and becoming unresponsive and was noted to be in ventricular fibrillation.  He was treated with Nitro and  mg PTA. Patient denies history or allergies for EMS.      Further history of present illness cannot be obtained due to the patient's critical state.     REVIEW OF SYSTEMS  Pertinent positives include chest pain and shortness of breath. Pertinent negatives include no other known symptoms. All other systems reviewed and negative.     Further ROS cannot be obtained due to the patient's critical state.    PAST MEDICAL HISTORY    Patient denies pertinent past medical history    SURGICAL HISTORY  patient denies any surgical history    FAMILY HISTORY  No known family history    CURRENT MEDICATIONS  Home Medications       Reviewed by Joel Triplett (Pharmacy Tech) on 04/06/21 at 0837  Med List Status: Not Addressed     Medication Last Dose Status        Patient Chidi Taking any Medications                         ALLERGIES  Patient denies allergies    PHYSICAL EXAM  VITAL SIGNS: Pulse 91   Resp 14   SpO2 100%     Constitutional: Unresponsive   HENT: Normocephalic, Atraumatic, purplish discoloration  to upper chest and face, Bilateral external ears normal, dentures, Nose normal.   Eyes: PERRL, Conjunctiva normal.   Neck: Normal range of motion, Supple.    Cardiovascular: PEA   Thorax & Lungs: symmetrical breath sounds with bagging   Abdomen: Benign abdominal exam, no distention, no pulsatile mass  Skin: cool, pale  Extremities: pale and cool, no palpable pulse  Neurologic: No spontaneous movements  Psychiatric: Unresponsive                                                   DIAGNOSTIC STUDIES / PROCEDURES\    LABS  Results for orders placed or performed during the hospital encounter of 04/06/21   EC-ECHOCARDIOGRAM COMPLETE W/ CONT   Result Value Ref Range    Left Ventrical Ejection Fraction 35    CBC with Differential   Result Value Ref Range    WBC 7.6 4.8 - 10.8 K/uL    RBC 4.69 (L) 4.70 - 6.10 M/uL    Hemoglobin 14.5 14.0 - 18.0 g/dL    Hematocrit 45.4 42.0 - 52.0 %    MCV 96.8 81.4 - 97.8 fL    MCH 30.9 27.0 - 33.0 pg    MCHC 31.9 (L) 33.7 - 35.3 g/dL    RDW 50.4 (H) 35.9 - 50.0 fL    Platelet Count 177 164 - 446 K/uL    MPV 10.7 9.0 - 12.9 fL    Neutrophils-Polys 30.00 (L) 44.00 - 72.00 %    Lymphocytes 59.00 (H) 22.00 - 41.00 %    Monocytes 2.00 0.00 - 13.40 %    Eosinophils 3.00 0.00 - 6.90 %    Basophils 0.00 0.00 - 1.80 %    Nucleated RBC 1.70 /100 WBC    Neutrophils (Absolute) 2.74 1.82 - 7.42 K/uL    Lymphs (Absolute) 4.48 1.00 - 4.80 K/uL    Monos (Absolute) 0.15 0.00 - 0.85 K/uL    Eos (Absolute) 0.23 0.00 - 0.51 K/uL    Baso (Absolute) 0.00 0.00 - 0.12 K/uL    NRBC (Absolute) 0.13 K/uL   Complete Metabolic Panel (CMP)   Result Value Ref Range    Sodium 138 135 - 145 mmol/L    Potassium 3.4 (L) 3.6 - 5.5 mmol/L    Chloride 102 96 - 112 mmol/L    Co2 18 (L) 20 - 33 mmol/L    Anion Gap 18.0 (H) 7.0 - 16.0    Glucose 224 (H) 65 - 99 mg/dL    Bun 14 8 - 22 mg/dL    Creatinine 1.16 0.50 - 1.40 mg/dL    Calcium 10.2 8.5 - 10.5 mg/dL    AST(SGOT) 25 12 - 45 U/L    ALT(SGPT) 27 2 - 50 U/L    Alkaline  Phosphatase 92 30 - 99 U/L    Total Bilirubin 0.3 0.1 - 1.5 mg/dL    Albumin 3.1 (L) 3.2 - 4.9 g/dL    Total Protein 5.5 (L) 6.0 - 8.2 g/dL    Globulin 2.4 1.9 - 3.5 g/dL    A-G Ratio 1.3 g/dL   Troponin   Result Value Ref Range    Troponin T 22 (H) 6 - 19 ng/L   COV-2, FLU A/B, AND RSV BY PCR (2-4 HOURS CEPHEID): Collect NP swab in VTM    Specimen: Respirate   Result Value Ref Range    Influenza virus A RNA Negative Negative    Influenza virus B, PCR Negative Negative    RSV, PCR Negative Negative    SARS-CoV-2 by PCR NotDetected     SARS-CoV-2 Source NP Swab    aPTT   Result Value Ref Range    APTT 37.5 (H) 24.7 - 36.0 sec   Prothrombin Time   Result Value Ref Range    PT 17.1 (H) 12.0 - 14.6 sec    INR 1.35 (H) 0.87 - 1.13   Heparin Xa (Unfractionated)   Result Value Ref Range    Heparin Xa (UFH) <0.10 IU/mL   ESTIMATED GFR   Result Value Ref Range    GFR If African American >60 >60 mL/min/1.73 m 2    GFR If Non African American >60 >60 mL/min/1.73 m 2   COD - Adult (Type and Screen)   Result Value Ref Range    ABO Grouping Only A     Rh Grouping Only POS     Antibody Screen-Cod NEG    Magnesium   Result Value Ref Range    Magnesium 2.1 1.5 - 2.5 mg/dL   LACTIC ACID   Result Value Ref Range    Lactic Acid 7.0 (HH) 0.5 - 2.0 mmol/L   TROPONIN   Result Value Ref Range    Troponin T 82 (H) 6 - 19 ng/L   Triglyceride   Result Value Ref Range    Triglycerides 129 0 - 149 mg/dL   DIFFERENTIAL MANUAL   Result Value Ref Range    Bands-Stabs 6.00 0.00 - 10.00 %    Manual Diff Status PERFORMED    PERIPHERAL SMEAR REVIEW   Result Value Ref Range    Peripheral Smear Review see below    PLATELET ESTIMATE   Result Value Ref Range    Plt Estimation Normal    MORPHOLOGY   Result Value Ref Range    RBC Morphology Normal     Smudge Cells Many    LACTIC ACID   Result Value Ref Range    Lactic Acid 2.2 (H) 0.5 - 2.0 mmol/L   ACCU-CHEK GLUCOSE   Result Value Ref Range    Glucose - Accu-Ck 148 (H) 65 - 99 mg/dL   EKG   Result Value  Ref Range    Report       [facility] Emergency Dept.    Test Date:  2021  Pt Name:    KISHORE KENDALL                 Department: ER  MRN:        [id number]                      Room:        06  Gender:     Male                         Technician: [id number]  :        1945                   Requested By:SYEDA HERNANDEZ  Order #:    [id number]                    Reading MD:    Measurements  Intervals                                Axis  Rate:       99                           P:  ID:                                      QRS:        80  QRSD:       172                          T:          60  QT:         398  QTc:        511    Interpretive Statements  Atrial fibrillation  Right bundle branch block  ST elevation secondary to IVCD  No previous ECG available for comparison     EKG   Result Value Ref Range    Report       [facility] Emergency Dept.    Test Date:  2021  Pt Name:    KISHORE FRANKLIN                 Department: ER  MRN:        [id number]                      Room:        06  Gender:     Male                         Technician: [id number]  :        1945                   Requested By:SYEDA HERNANDEZ  Order #:    [id number]                    Reading MD:    Measurements  Intervals                                Axis  Rate:       116                          P:  ID:                                      QRS:        75  QRSD:       102                          T:          100  QT:         345  QTc:        479    Interpretive Statements  Atrial fibrillation  Borderline low voltage, extremity leads  Borderline repolarization abnormality  Borderline prolonged QT interval  Compared to ECG 2021 08:03:24  Right bundle-branch block no longer present  Intraventricular conduction delay no longer present  ST (T wave) deviation no longer present     EKG   Result Value Ref Range    Report       [facility]    Test Date:  2021  Pt Name:    KISHORE FRANKLIN                  Department: 161  MRN:        [id number]                      Room:       32  Gender:     Male                         Technician: [id number]  :        1945                   Requested By:AYESHA VENTURAOS  Order #:    [id number]                    Reading MD:    Measurements  Intervals                                Axis  Rate:       68                           P:          71  UT:         188                          QRS:        62  QRSD:       82                           T:          98  QT:         424  QTc:        451    Interpretive Statements  SINUS RHYTHM  VENTRICULAR TRIGEMINY  ANTERIOR INFARCT, AGE INDETERMINATE  Compared to ECG 2021 09:01:48  Ventricular premature complex(es) now present  Myocardial infarct finding now present  Atrial fibrillation no longer present     ISTAT ARTERIAL BLOOD GAS   Result Value Ref Range    Ph 7.214 (LL) 7.400 - 7.500    Pco2 51.4 (HH) 26.0 - 37.0 mmHg    Po2 358 (H) 64 - 87 mmHg    Tco2 22 20 - 33 mmol/L    S02 100 (H) 93 - 99 %    Hco3 20.7 17.0 - 25.0 mmol/L    BE -7 (L) -4 - 3 mmol/L    Body Temp 35.0 C degrees    O2 Therapy 100 %    iPF Ratio 358     Ph Temp Lorri 7.241 (LL) 7.400 - 7.500    Pco2 Temp Co 47.1 (H) 26.0 - 37.0 mmHg    Po2 Temp Cor 347 (H) 64 - 87 mmHg    Specimen Arterial     Action Range Triggered YES     Inst. Qualified Patient YES    Initial EKG read by me in the emergency department.  It showed evidence of atrial fibrillation.  There is evidence of a right bundle branch block.  There is some nonspecific ST and T wave changes.  No evidence of acute MI.  No PVCs seen.  All labs reviewed by me.    EKG  12 lead EKG interpreted by me as noted above.    RADIOLOGY  DX-CHEST-PORTABLE (1 VIEW)   Final Result      1.  Right internal jugular catheter has been placed and appears appropriately located      2.  No pneumothorax      3.  Pre-existing endotracheal tube and enteric catheter appear appropriately located      4.  Mild atelectasis       CT-CTA CHEST PULMONARY ARTERY W/ RECONS   Final Result      1.  Negative for pulmonary embolism      2.  Mild atelectasis      3.  endotracheal tube and enteric catheter appear appropriately located      4.  Aortic and coronary atherosclerotic plaque            CT-HEAD W/O   Final Result      1.  No acute intracranial abnormality.   2.  Mild cortical atrophy.   3.  Chronic paranasal sinus disease.      EC-ECHOCARDIOGRAM COMPLETE W/ CONT   Final Result      DX-CHEST-PORTABLE (1 VIEW)   Final Result      1.  Endotracheal tube tip 7 cm below the elijah.   2.  Orogastric tube tip at the GE junction level.   3.  No pneumonia or pneumothorax.        The radiologist's interpretation of all radiological studies have been reviewed by me.    Intubation Procedure Note    Indication: Respiratory failure    Consent: Unable to be obtained due to the emergent nature of this procedure.    Medications Used: Unable to premedicate due to the emergent nature of the procedure    Procedure: The patient was placed in the appropriate position.  Cricoid pressure was utilized.  Intubation was performed by direct laryngoscopy using a laryngoscope and a 7.5 cuffed endotracheal tube.  The cuff was then inflated and the tube was secured appropriately at a distance of 23 cm to the dental ridge.  Initial confirmation of placement included bilateral breath sounds.  A chest x-ray to verify correct placement of the tube showed appropriate tube position.    The patient tolerated the procedure well.     Complications: None    COURSE & MEDICAL DECISION MAKING  Nursing notes, VS, PMSFHx reviewed in chart.     Patient need nursing notes and code sheet for exact course of the events.  Patient presented to the emergency department with chest pain or shortness of breath.  Patient had a episode of ventricular tachycardia upon arrival and then proceeded to go into V. fib arrest.  This was witnessed and CPR was immediately started and I was called to the  room.     7:43 AM Patient seen and examined at bedside. ERP paged overhead to patient room. CPR in progress.    7:45 AM Patient is pulseless. Rhythm of V-Fib. Patient shocked with 120J. CPR resumed.    7:48 AM Patient pulseless but in shockable rhythm. Shocks with 200 J. Treated with amiodarone.    7:49 AM Patient treated with calcium chloride.    7:50 AM Intubation procedure preformed at this time (see procedure note above for details). Patient treated with bicarb.    7:51 AM Patient treated with Epi. Pulse check reveals rate of 29. Continuing CPR.     7:54 AM Pulse check. Monitor shows wide complex with a rate of 20. Continue CPR. Patient treated with Epi.     7:56 AM Pulse check shows continuing wide complex with slow rate, continue CPR    7:57 AM Patient treated with 50 mg alteplase    7:59 AM Bedside US preformed by me shows no obvious right ventricle strain and no evidence of pneumothorax.    8:01 AM Patient has a pulse of 100 bpm. Paged hospitalist, intensivist, and cardiology. Ordered Echocardiogram, chest x-ray, CBC w/ diff, CMP, troponin, and EKG.    8:20 AM I discussed the patient's case and the above findings with Dr. Celestin (Hospitalist) who agreed to evaluate patient for hospitalization. Patients care will be transferred at this time.     8:21 AM I discussed the patient's case and the above findings with Dr. Salvador Ortzi (Intensivist) who will evaluate the patient in the ED.    8:25 AM I discussed the patient's case and the above findings with Dr. Macias (Cardiology). Will repage cardiology after the patient is seen by the intensivist.    8:26 AM Patient will be treated with Fentanyl for sedation.     8:27 AM He will be treated with a second dose of Alteplase 50 mg.    8:44 AM Paged cardiology per request of Intensivist. Concerns for MI at this time.     9:08 AM I discussed the patient's case and the above findings with Dr. Lee (cardiology) who will consult on    ER course  Patient received  alteplase in the emergency department due to the fact that patient had no resolution of symptoms after epinephrine calcium and bicarb.  Patient had a bluish discoloration to the upper chest and face and there was concern for possible pulmonary embolism.  Patient was hypoxic.  After intubation patient remained hypoxic and therefore I felt thrombolytics were not contraindicated and could be possibly life-saving.  Therefore patient was given thrombolytics with return of ROSC.  Stat echo was ordered and is pending.  Patient has not received any further antiarrhythmics since the amnio given during the code for the ventricular fibrillation.  EKG done after ROSC did not show evidence of an acute MI.  There were nonspecific changes and evidence of A. fib and a right bundle branch block.  However cardiology was consulted.  Chest x-ray shows evidence of good placement of the ET tube.  Patient has been maintaining his pressures and oxygenation has been over 90%.  Therefore care will be turned over to intensivist and hospitalist.  At this point it is unclear to me what the etiology of the patient's arrest is.  However MI versus PE is likely etiology.    CRITICAL CARE  The very real possibilty of a deterioration of this patient's condition required the highest level of my preparedness for sudden, emergent intervention.  I provided critical care services, which included medication orders, frequent reevaluations of the patient's condition and response to treatment, ordering and reviewing test results, and discussing the case with various consultants.  The critical care time associated with the care of the patient was 75 minutes. Review chart for interventions. This time is exclusive of any other billable procedures.     DISPOSITION:  Patient will be hospitalized by Dr. Celestin in critical condition.     FINAL IMPRESSION  V. fib arrest  Chest pain  Shortness of breath     Daisy NGUYỄN (Scribalva), am scribing for, and in the  presence of, Yolanda Pink M.D..    Electronically signed by: Daisy Dixon (Scribe), 4/6/2021    I, Yolanda Pink M.D. personally performed the services described in this documentation, as scribed by Daisy Dixon in my presence, and it is both accurate and complete. C    The note accurately reflects work and decisions made by me.  Yolanda Pink M.D.  4/6/2021  1:34 PM

## 2021-04-06 NOTE — CONSULTS
Cardiology Initial Consultation    Date of Service  4/6/2021    Referring Physician  Primitivo White M.D.    Reason for Consultation  VF arrest    History of Presenting Illness  Ian Condon is a 76 y.o. male with no known cardiac history who presented 4/6/2021 with chest pain.  Reportedly patient had chest discomfort for 3 days prior to presentation.  Case discussed with Dr. Pink.  Shortly after arrival in the ER, patient went to VF arrest.  ROSC was achieved in 4 minutes reportedly.  Patient was given alteplase 100 mg due to concern for pulmonary embolism.  Cardiology consulted for further evaluation.    Review of Systems  Review of Systems   Unable to perform ROS: Intubated     Past medical, surgical, family and social history is unknown for this patient.  He is intubated and no family at bedside.    Past Medical History   has no past medical history on file.    Surgical History   has no past surgical history on file.    Family History  family history is not on file.    Social History       Home Medications   None       Inpatient Medications  • HYDROmorphone  0.5-2 mg Q HOUR PRN   • propofol  0-80 mcg/kg/min Continuous   • Respiratory Therapy Consult   Continuous RT   • senna-docusate  2 tablet BID    And   • polyethylene glycol/lytes  1 Packet QDAY PRN    And   • magnesium hydroxide  30 mL QDAY PRN    And   • bisacodyl  10 mg QDAY PRN   • MD Alert...Adult ICU Electrolyte Replacement per Pharmacy   PHARMACY TO DOSE   • lidocaine  1-2 mL Q30 MIN PRN   • NS   Continuous   • ipratropium-albuterol  3 mL Q2HRS PRN (RT)   • norepinephrine (Levophed) infusion  0-30 mcg/min Continuous   • acetaminophen  650 mg Q6HRS PRN   • ondansetron  4 mg Q4HRS PRN   • ondansetron  4 mg Q4HRS PRN   • amiodarone infusion  0.5-1 mg/min Continuous   • insulin regular  1-6 Units Q6HRS    And   • dextrose 50%  50 mL Q15 MIN PRN   • pantoprazole  40 mg BID   • atorvastatin  40 mg Q EVENING   • K+ Scale: Goal of 4.5  1 Each Q6HRS    • potassium chloride (KCL-CENTRAL) IV *Administer in ICU only*  20 mEq Once       Allergies  No Known Allergies    Vital signs in last 24 hours  Temp:  [36.1 °C (96.9 °F)] 36.1 °C (96.9 °F)  Pulse:  [] 93  Resp:  [14-32] 32  BP: ()/() 128/63  SpO2:  [42 %-100 %] 100 %    Physical Exam  Physical Exam   Constitutional:   Patient intubated.   HENT:   Head: Normocephalic and atraumatic.   Neck: No JVD present.   Cardiovascular: Normal rate, regular rhythm and intact distal pulses. Exam reveals no gallop and no friction rub.   No murmur heard.  Pulmonary/Chest: He has no wheezes. He has no rales.   Abdominal: Soft. He exhibits no distension.   Musculoskeletal:         General: No edema.      Cervical back: Neck supple.   Neurological:   Patient intubated   Skin: Skin is warm and dry.       Lab Review  Recent Labs     04/06/21  0759   WBC 7.6   RBC 4.69*   HEMOGLOBIN 14.5   HEMATOCRIT 45.4   PLATELETCT 177   MCV 96.8   MPV 10.7     Recent Labs     04/06/21  0759   SODIUM 138   POTASSIUM 3.4*   CHLORIDE 102   CO2 18*   GLUCOSE 224*   BUN 14   CREATININE 1.16      Lab Results   Component Value Date/Time    TROPONINT 82 (H) 04/06/2021 09:02 AM       Lab Results   Component Value Date/Time    ASTSGOT 25 04/06/2021 07:59 AM    ALTSGPT 27 04/06/2021 07:59 AM     Lab Results   Component Value Date/Time    TRIGLYCERIDE 129 04/06/2021 09:02 AM         Labs reviewed as noted above.  Lactate 7, downtrending.  Normal renal function.        Cardiac Imaging and Procedures Review  EKG performed today at 11:38 AM was personally reviewed and per my interpretation shows sinus rhythm with PVCs. Anteroseptal q-waves with ST elevation.   Initial EKG post-arrest showed atrial fibrillation.     Echocardiogram performed today was personally reviewed and per my interpretation shows mildly reduced systolic function.  Anterior wall hypokinesis.    Assessment/Plan    VF arrest:  Respiratory failure:  Severe lactic  acidosis:  Atrial fibrillation:  Upper GI bleed:  Patient status post alteplase administration for presumptive PE:    Patient with witnessed VF arrest in the ER.  He was treated with alteplase by the ER physician for presumptive pulmonary embolism.  He has not had any recurrent ventricular arrhythmias since ROSC was achieved.  However he is having a slow upper GI bleed with blood in his OG tube.  Cardiology consulted for further management.  His echocardiogram was reviewed and shows wall motion abnormalities.  Repeat EKG ordered and personally interpreted showing Q waves in the anteroseptal leads with ST elevation.  Patient had severe lactic acidosis on presentation.    Case discussed with Dr. Rosen multiple times over the day.  Since the patient received alteplase for presumed pulmonary embolism, would like to delay coronary angiography till tomorrow if possible given the risk for bleeding.  However once his EKG and echocardiogram were reviewed we discussed the case again.  We decided to proceed with a coronary angiogram in the afternoon today to minimize the risk for bleeding.    He continues to be on amiodarone for his VF arrest.  His lactic acidosis improving.  His ongoing slow GI bleed is of concern given the planned coronary angiogram and possible dual antiplatelet therapy however at this time the risks of a cardiac event/death outweigh the risks of bleeding that could happen on dual antiplatelet therapy.    In terms of atrial fibrillation, suspect this was likely transient in the setting of his arrest.  He is currently in sinus rhythm.  No anticoagulation for now given his upper GI bleed.  Will reevaluate tomorrow.    Critical care time spent over the course of the day was 50 minutes managing this patient as noted above.  This time does not overlap with any other physicians or for billable procedures.      Ching Macias MD, [facility]  Cardiologist  [facility]

## 2021-04-06 NOTE — DISCHARGE PLANNING
Medical Social Work    LSW responded to critically ill pt in [facility]. Per EMS, pt called with the complaint of chest pain, walked himself to ambulance and was stable throughout the ride. Upon arrival to the ED, pt became critically ill and CPR was started. Pt intubated in ED. Pt's name is aIn Condon (: 1945). LSW conducted NOK search, however, no results were found.     LSW found pt's [facility] card in his wallet. VA ID #[id number]. LSW called the [facility] for emergency contacts, spoke to pt's primary [facility] nurse, Mari. Mari reports pt's only contact/possible NOK listed is Melanyemanuel Isiah [phone], who apparently lives in [place]. Mari reports pt has no records of AD documents.

## 2021-04-06 NOTE — ED NOTES
"RN spoke with Lab regarding status of CBC sent at 0800.  Per Tech, having to \"plate the slide for albumin\" so it is taking longer.  Tech able to given RN some results and will post all when complete.      Hbg 14.5  Hct  45.4  WBC 7.6  Platelet 17.7    "

## 2021-04-06 NOTE — CONSULTS
PULMONARY AND CRITICAL CARE MEDICINE CONSULTATION    Date of Consultation:  4/6/2021    Requesting Physician:  Yolanda Pink MD    Consulting Physician:  Natanael Montejo MD    Reason for Consultation: Critical care management in gentleman with cardiac arrest    Chief Complaint: Respiratory failure, cardiac arrest    History of Present Illness:    I was kindly asked to see and evaluate Ian Condon, a 76 y.o. male for evaluation and management of the above problem.    The entire history is obtained from healthcare providers and the medical record as this gentleman cannot provide me with any history.  There is no past medical history available to me at this time.  I am told that this gentleman presented to the emergency department via EMS with chest pain and shortness of breath.  He was awake and alert when he was initially admitted to the ED.  Apparently he has had chest pain and shortness of breath off and on for the last 3 days.  Shortly after being admitted to the ED, he complained of sharp chest pain and became unresponsive.  He was briefly in ventricular tachycardia and then had ventricular fibrillation.  Emergent resuscitation was initiated with CPR, IV epinephrine, IV amiodarone, IV calcium and IV bicarbonate solution.  He had mottling from the mid chest superiorly and there was a concern that he may have an acute massive pulmonary embolism.  He was empirically administered 50 mg of IV alteplase.  ROSC was achieved after approximately 18 minutes.  A second 50 mg dose of IV alteplase was administered per protocol.  After ROSC, he was in atrial fibrillation.  He ultimately developed purposeful movements as he was trying to reach up for his endotracheal tube with his hands and trying to sit up.  He was subsequently sedated for his comfort.  An orogastric tube was placed and bright red blood was initially aspirated from his stomach.  His intubation was atraumatic and no blood was noticed in his  oropharynx at the time of intubation.    Medications Prior to Admission:    No current facility-administered medications on file prior to encounter.     No current outpatient medications on file prior to encounter.       Current Medications:      Current Facility-Administered Medications:   •  EPINEPHrine (Adrenalin) injection 1 mg, 1 mg, Injection, Once, Yolanda Pink M.D.  •  HYDROmorphone (Dilaudid) injection 0.5-2 mg, 0.5-2 mg, Intravenous, Q HOUR PRN, Natanael Montejo M.D., 1 mg at 04/06/21 0843  •  propofol (DIPRIVAN) injection, 0-80 mcg/kg/min, Intravenous, Continuous, Last Rate: 4.5 mL/hr at 04/06/21 0911, 10 mcg/kg/min at 04/06/21 0911 **AND** Triglycerides Starting now and then Every 3 Days, , , Every 3 Days (0300), Natanael Montejo M.D.  •  Respiratory Therapy Consult, , Nebulization, Continuous RT, Natanael Montejo M.D.  •  senna-docusate (PERICOLACE or SENOKOT S) 8.6-50 MG per tablet 2 tablet, 2 tablet, Enteral Tube, BID **AND** polyethylene glycol/lytes (MIRALAX) PACKET 1 Packet, 1 Packet, Enteral Tube, QDAY PRN **AND** magnesium hydroxide (MILK OF MAGNESIA) suspension 30 mL, 30 mL, Enteral Tube, QDAY PRN **AND** bisacodyl (DULCOLAX) suppository 10 mg, 10 mg, Rectal, QDAY PRN, Natanael Montejo M.D.  •  MD Alert...ICU Electrolyte Replacement per Pharmacy, , Other, PHARMACY TO DOSE, Natanael Montejo M.D.  •  lidocaine (XYLOCAINE) 1 % injection 1-2 mL, 1-2 mL, Tracheal Tube, Q30 MIN PRN, Natanael Montejo M.D.  •  NS infusion, , Intravenous, Continuous, Natanael Montejo M.D.  •  ipratropium-albuterol (DUONEB) nebulizer solution, 3 mL, Nebulization, Q2HRS PRN (RT), Natanael Montejo M.D.  •  norepinephrine (Levophed) infusion 8 mg/250 mL (premix), 0-30 mcg/min, Intravenous, Continuous, Natanael Montejo M.D.  •  acetaminophen (Tylenol) tablet 650 mg, 650 mg, Enteral Tube, Q6HRS PRN, Dejan Peres D.O.  •  ondansetron (ZOFRAN) syringe/vial  injection 4 mg, 4 mg, Intravenous, Q4HRS PRN, Dejan Peres D.O.  •  ondansetron (ZOFRAN ODT) dispertab 4 mg, 4 mg, Enteral Tube, Q4HRS PRN, Dejan Peres, D.O.  •  amiodarone (NEXTERONE) 360 mg/200 mL infusion, 0.5-1 mg/min, Intravenous, Continuous, Natanael Montejo M.D.  •  insulin regular (HumuLIN R,NovoLIN R) injection, 1-6 Units, Subcutaneous, Q6HRS **AND** POC Blood Glucose, , , Q6H **AND** NOTIFY MD and PharmD, , , Once **AND** [DISCONTINUED] glucose 4 g chewable tablet 16 g, 16 g, Oral, Q15 MIN PRN **AND** dextrose 50% (D50W) injection 50 mL, 50 mL, Intravenous, Q15 MIN PRN, Natanael Montejo M.D.  •  NS infusion 1,000 mL, 1,000 mL, Intravenous, Once, Yolanda Pink M.D.  •  NS infusion 2,000 mL, 2,000 mL, Intravenous, Once, Yolanda Pink M.D.  •  pantoprazole (PROTONIX) injection 40 mg, 40 mg, Intravenous, BID, Dejan Peres, D.O.  •  atorvastatin (LIPITOR) tablet 40 mg, 40 mg, Oral, Q EVENING, Dejan Peres D.O.  No current outpatient medications on file.    Allergies:    Patient has no allergy information on record.    Past Surgical History:    No past surgical history on file.    Past Medical History:    No past medical history on file.    Social History:    Social History     Socioeconomic History   • Marital status: Not on file     Spouse name: Not on file   • Number of children: Not on file   • Years of education: Not on file   • Highest education level: Not on file   Occupational History   • Not on file   Tobacco Use   • Smoking status: Not on file   Substance and Sexual Activity   • Alcohol use: Not on file   • Drug use: Not on file   • Sexual activity: Not on file   Other Topics Concern   • Not on file   Social History Narrative   • Not on file     Social Determinants of Health     Financial Resource Strain:    • Difficulty of Paying Living Expenses:    Food Insecurity:    • Worried About Running Out of Food in the Last Year:    • Ran Out of Food in  "the Last Year:    Transportation Needs:    • Lack of Transportation (Medical):    • Lack of Transportation (Non-Medical):    Physical Activity:    • Days of Exercise per Week:    • Minutes of Exercise per Session:    Stress:    • Feeling of Stress :    Social Connections:    • Frequency of Communication with Friends and Family:    • Frequency of Social Gatherings with Friends and Family:    • Attends Nondenominational Services:    • Active Member of Clubs or Organizations:    • Attends Club or Organization Meetings:    • Marital Status:    Intimate Partner Violence:    • Fear of Current or Ex-Partner:    • Emotionally Abused:    • Physically Abused:    • Sexually Abused:        Family History:    No family history on file.    Review of System:    Review of Systems   Unable to perform ROS: Acuity of condition       Physical Examination:    /111   Pulse (!) 104   Resp (!) 28   Ht 1.778 m (5' 10\")   Wt 74.8 kg (165 lb)   SpO2 99%   BMI 23.68 kg/m²   Physical Exam   Constitutional:   On ventilator   HENT:   Head: Normocephalic and atraumatic.   Right Ear: External ear normal.   Left Ear: External ear normal.   Nose: Nose normal.   Mouth/Throat: No oropharyngeal exudate.   Eyes: Pupils are equal, round, and reactive to light. Conjunctivae are normal. Right eye exhibits no discharge. Left eye exhibits no discharge.   Neck: No tracheal deviation present.   Cardiovascular: Intact distal pulses. Exam reveals no gallop.   No murmur heard.  Initially atrial fibrillation, but then sinus tachycardia   Pulmonary/Chest: He has no wheezes. He has no rales.   Abdominal: Soft. Bowel sounds are normal. He exhibits no distension. There is no abdominal tenderness. There is no rebound.   Musculoskeletal:         General: No tenderness, deformity or edema.      Cervical back: Normal range of motion and neck supple.      Comments: No clubbing or cyanosis   Neurological:   He had purposeful movements after ROSC as he was trying to reach " for his endotracheal tube and also trying to sit up   Skin: Skin is warm and dry. He is not diaphoretic.       Laboratory Data:    Recent Labs     04/06/21  0924   ISTATAPH 7.214*   ISTATAPCO2 51.4*   ISTATAPO2 358*   ISTATATCO2 22   VGMPBDP4UFH 100*   ISTATARTHCO3 20.7   ISTATARTBE -7*   ISTATTEMP 35.0 C   ISTATFIO2 100   ISTATSPEC Arterial   ISTATAPHTC 7.241*   ELHDRTBA7UT 347*         Recent Labs     04/06/21  0759   SODIUM 138   POTASSIUM 3.4*   CHLORIDE 102   CO2 18*   GLUCOSE 224*   BUN 14   CREATININE 1.16   CALCIUM 10.2                   Imaging:    I personally viewed all the available CXR and CT scan images as well as reviewed the radiology interpretation reports.    EC-ECHOCARDIOGRAM COMPLETE W/ CONT         DX-CHEST-PORTABLE (1 VIEW)   Final Result      1.  Endotracheal tube tip 7 cm below the elijah.   2.  Orogastric tube tip at the GE junction level.   3.  No pneumonia or pneumothorax.      CT-CTA CHEST PULMONARY ARTERY W/ RECONS    (Results Pending)   CT-HEAD W/O    (Results Pending)       Assessment and Plan:    Acute respiratory failure with hypoxia (HCC)  Assessment & Plan  Intubated 4/6  I have initiated all the appropriate ventilator bundles  Full vent support    Cardiac arrest (HCC)  Assessment & Plan  Ventricular fibrillation cardiac arrest in ED  ROSC with CPR, epinephrine, amiodarone, calcium, bicarbonate solution  Empirically given 100 mg of IV alteplase during resuscitation for presumed acute pulmonary embolism  Purposeful movements following ROSC - not a candidate for therapeutic hypothermia  ECG with no evidence of acute STEMI  Start amiodarone drip  Stat echocardiogram  Optimize potassium and magnesium  Cardiology consultation    Hyperglycemia  Assessment & Plan  Check glycohemoglobin  Sliding scale insulin    Upper gastrointestinal bleed  Assessment & Plan  Bloody NG aspirate - query related to alteplase  IV PPI twice daily  Trend hemoglobin    AF (atrial fibrillation) ([facility])  Assessment  & Plan  Paroxysmal atrial fibrillation in ED  Check thyroid function  Echocardiogram  Optimize potassium and magnesium    Hypokalemia  Assessment & Plan  Replete potassium    I have assessed and reassessed his respiratory status with ventilator adjustments, airway mechanics, ventilator waveforms, blood pressure, hemodynamics, cardiovascular status with titration of norepinephrine and his neurologic status.  He is at increased risk for worsening respiratory, CNS and cardiovascular system dysfunction.    High risk of deterioration and worsening vital organ dysfunction and death without the above critical care interventions.    Thank you for allowing me to participate in the care of this gentleman.  I will continue to follow him with great interest.    The patient is critically ill.  Critical care time = 120 minutes in directly providing and coordinating critical care and extensive data review.  No time overlap and excludes procedures.    Discussed with ER physician, hospitalist, RN, RT, clinical pharmacist    Natanael Montejo MD  Pulmonary and Critical Care Medicine

## 2021-04-06 NOTE — PROGRESS NOTES
Patient to cath lab 4 at 1525 with this RN, Iban RN and RT, transported via icu bed, connected to zoll monitor, gtts verified with cath lab RN

## 2021-04-06 NOTE — PROCEDURES
"CARDIAC CATHETERIZATION REPORT    Referring Provider: Ching Macias M.D.    PROCEDURE PHYSICIAN: Raffaele Rosen MD, [facility], [facility]  ASSISTANT: None    IMPRESSIONS:  1. NSTEMI due to occluded diagonal complicated by cardiogenic shock/cardiac arrest  2. Severe disease of the proximal and mid LAD  3. Moderate Circumflex disease  4. Moderate elevation in LVEDP (23 mmHg)  5. Successful PCI of the proximal through mid LAD using one CHARO, IVUS guidance  6. Successful PCI/recanalization of the diagonal using one CHARO    Recommendations:  150 cc/hr x4 hours  Usual post cath care  Dual antiplatelet therapy for 12 months  staged MPI or relook angiogram to asseess circumflex lesions    Pre-procedure diagnosis: NSTEMI, high risk, shock  Post-procedure diagnosis: Cardiogenic shock, NSTEMI    Procedure performed  Selective coronary angiography  Left heart catheterization  Percutaneous coronary intervention - Stent Placement (Diagonal- occluded, LAD)  Intravascular Ultrasound (LAD)    Conscious sedation was supervised by myself and administered by trained personnel using fentanyl and versed between 1525 and 1626. The patient tolerated sedation without complication.     Procedure Description  1. Access: 5/6 Amharic right radial artery Micropuncture technique was utilized following local anesthesia with lidocaine.  A radial cocktail containing verapamil, heparin and saline was administered in the radial artery sheath    2. Diagnostic description: The catheter was passed to the central circulation with the aide of J tipped 0.35\" wire. A 5F TIG 4.0 was used to inject the coronary circulation enter the left ventricle during invasive hemodynamic monitoring.     3. Description of Intervention: After confirming therapeutic anticoagulation intervention proceeded. A 6F EBU 3.0 guiding catheter was used. The lesion in the diagonal 1 was crossed with a 0.014\" BMW wire. The lesion was predilated with a 2.0 x12 compliant balloon, then a 2.5x12 NC " balloon at 12 [place]. Next a prowater wire was placed in the diagonal and the BMW wire repositioned to the LAD. Following wiring the LAD the patient developed anterior ST elevation. The LAD lesions were dilated with a 2.5x12 NC balloon at 12 [place]er[place]. I then deployed a 2.25x15 Jarod CHARO in the diagonal at 12 america. The LAD was re dilated with a 2.5x12 NC balloon and subsequently a 2.5x30 Jarod CHARO was deployed from proximal to mid segment of the vessel, jailing the diagonal branch. I deployed the stent at 18 america. The proximal portion was dilated with a 3.0x15 NC balloon at 12 america. 240 mcg of IC NTG was administered followed by IVUS which showed excellent stent sizing and absence of edge complication.     4. Closing: At completion of the procedure the relevant equipment was removed from the body and hemostasis achieved by Radial band    Findings   Hemodynamics:   Aorta: 89/64 mmHg  LV: 89/23 mmHg    Coronary Anatomy   Left Main: Minimal luminal irregularities   LAD: ostial 40% stenosis, proximal 90%, mid 95% stenosis. The first diagonal is 100% occluded proximally. The second diagonal is normal    LCx: 50% proximal stenosis. The OM1 is small. The OM2 and 3 arise from the terminal AV groove circumflex with a bifurcation stenosis (Med[place] 0,1,1) OM2 has a proximal 30% stenosis, OM3 has a proximal 50% stenosis.    RCA: Dominant, long proximal to mid 30% stenosis     Results of intervention to the LAD:  Pre: 95% stenosis and PATSY III flow  Post: 0% residual stenosis and PATSY III flow. No dissection or distal embolization.    Results of intervention to the diagonal 1:  Pre: 100% stenosis and PATSY I flow  Post: 0% residual stenosis and PATSY III flow. No dissection or distal embolization.    Technical Factors  1. Complications: None  2. Estimated Blood Loss: <50 cc  3. Specimens: None  4. Contrast Volume: 88 ml  5. Medications: Radial cocktail (Verapamil 2.5 mg, Nitroglycerin 100 mcg) Heparin to maintain ACT >250 Intracoronary  nitroglycerin clopidogrel 75 mg  6. Radiation (Air Kerma): 568 mGy

## 2021-04-06 NOTE — ED NOTES
V-fib arrest  0742 v-fib arrest CPR started  0745 Pulseless v-fib shock 120J  0748 300mg Amio, v-fib Shock 200J  0749 1g Calcium  0750 bradycardia +pulse HR 27.  Resumed CPR, 50 Meq Bicarb given   0751 1mg EPI  0752  + pulse HR 20.  Resumed CPR, intubated 8.0 ETT  0754  +pulse HR 20,  Resumed CPR, 1mg EPI  0755 20g L AC  0756 + pulse, HR 20, resumed CPR  0757 Alteplase 50mg given   0758 1mg EPI  0759 +Pulse HR 20, Resumed CPR  0800 50 Meq Bicarb  0801 Pulse HR 70.  ROSC.  /51  0803 HR 98  0805 EKG  0813 CXR  0827 2nd dose 50mg Alteplase

## 2021-04-06 NOTE — ED TRIAGE NOTES
"Chief Complaint   Patient presents with   • Chest Pain     intermittenly for 3 days, consistant past 3 hours.   • Shortness of Breath     for 3 hours       PT BIB [facility] from home, ambulatory from [place] [facility].  Pt given 324mg ASA and 1 Nitro tab PTA.  EMS initial 12 lead showed ST depression in 2,3,AVF. ST depression resolved after ASA and Nitro. Upon arrival Pt A&Ox3, RN beginning triage.  Pt chest pain returned 10/10, SOB and restless.  Pt then when blue in face, cationic, and ridged.  V-tach for 3-6 beats, then v-fib.    /63   Pulse 94   Temp 36.1 °C (96.9 °F) (Temporal)   Resp (!) 28   Ht 1.778 m (5' 10\")   Wt 74.8 kg (165 lb)   SpO2 100%   BMI 23.68 kg/m²     "

## 2021-04-06 NOTE — PROGRESS NOTES
Monitor summary: SR-Afib-Accelerated junctional    Ectopy: occasional trigeminal PVCs      .12/.10/.36    Intermittently went into afib or accelerated junctional, would not sustain, rate

## 2021-04-06 NOTE — PROCEDURES
Date of service:  4/6/2021    Title:  Central venous catheter placement - internal jugular vein    Indication:  Cardiac arrest    Narrative:    A time out was performed identifying the correct patient, correct procedure and correct location prior to this procedure.    The right neck was prepped with chlorhexidine and draped in the usual sterile fashion.  1% Xylocaine solution was used for topical anesthesia.  A triple lumen central venous catheter was placed into the right internal jugular vein under ultrasound guidance using the technique described by Zeke without difficulty or apparent complication.  The line was sutured into place and a sterile dressing was placed over the line.  All 3 ports flush and return venous blood easily.  The patient tolerated the procedure quite nicely.  No complications are apparent.  A STAT CXR is ordered to confirm placement.      Natanael Montejo MD  Pulmonary and Critical Care Medicine

## 2021-04-07 ENCOUNTER — APPOINTMENT (OUTPATIENT)
Dept: RADIOLOGY | Facility: MEDICAL CENTER | Age: 76
DRG: 246 | End: 2021-04-07
Attending: INTERNAL MEDICINE
Payer: MEDICARE

## 2021-04-07 LAB
ACT BLD: 252 SEC (ref 74–137)
ACT BLD: 257 SEC (ref 74–137)
ACT BLD: 274 SEC (ref 74–137)
ACTION RANGE TRIGGERED IACRT: NO
ACTION RANGE TRIGGERED IACRT: NO
ANION GAP SERPL CALC-SCNC: 8 MMOL/L (ref 7–16)
BASE EXCESS BLDA CALC-SCNC: -4 MMOL/L (ref -4–3)
BASE EXCESS BLDA CALC-SCNC: -5 MMOL/L (ref -4–3)
BASOPHILS # BLD AUTO: 0.2 % (ref 0–1.8)
BASOPHILS # BLD: 0.02 K/UL (ref 0–0.12)
BODY TEMPERATURE: ABNORMAL DEGREES
BODY TEMPERATURE: ABNORMAL DEGREES
BREATHS SETTING VENT: 20
BUN SERPL-MCNC: 12 MG/DL (ref 8–22)
CALCIUM SERPL-MCNC: 8.1 MG/DL (ref 8.5–10.5)
CHLORIDE SERPL-SCNC: 111 MMOL/L (ref 96–112)
CO2 BLDA-SCNC: 21 MMOL/L (ref 20–33)
CO2 BLDA-SCNC: 22 MMOL/L (ref 20–33)
CO2 SERPL-SCNC: 20 MMOL/L (ref 20–33)
CREAT SERPL-MCNC: 1.12 MG/DL (ref 0.5–1.4)
DELSYS IDSYS: ABNORMAL
END TIDAL CARBON DIOXIDE IECO2: 34 MMHG
EOSINOPHIL # BLD AUTO: 0.05 K/UL (ref 0–0.51)
EOSINOPHIL NFR BLD: 0.4 % (ref 0–6.9)
ERYTHROCYTE [DISTWIDTH] IN BLOOD BY AUTOMATED COUNT: 49 FL (ref 35.9–50)
EST. AVERAGE GLUCOSE BLD GHB EST-MCNC: 111 MG/DL
GLUCOSE BLD-MCNC: 103 MG/DL (ref 65–99)
GLUCOSE BLD-MCNC: 91 MG/DL (ref 65–99)
GLUCOSE BLD-MCNC: 99 MG/DL (ref 65–99)
GLUCOSE SERPL-MCNC: 108 MG/DL (ref 65–99)
HBA1C MFR BLD: 5.5 % (ref 4–5.6)
HCO3 BLDA-SCNC: 20.2 MMOL/L (ref 17–25)
HCO3 BLDA-SCNC: 21.2 MMOL/L (ref 17–25)
HCT VFR BLD AUTO: 35.8 % (ref 42–52)
HGB BLD-MCNC: 11.9 G/DL (ref 14–18)
HGB BLD-MCNC: 12.2 G/DL (ref 14–18)
HOROWITZ INDEX BLDA+IHG-RTO: 247 MM[HG]
IMM GRANULOCYTES # BLD AUTO: 0.07 K/UL (ref 0–0.11)
IMM GRANULOCYTES NFR BLD AUTO: 0.6 % (ref 0–0.9)
INST. QUALIFIED PATIENT IIQPT: YES
INST. QUALIFIED PATIENT IIQPT: YES
LYMPHOCYTES # BLD AUTO: 0.69 K/UL (ref 1–4.8)
LYMPHOCYTES NFR BLD: 6.1 % (ref 22–41)
MAGNESIUM SERPL-MCNC: 1.7 MG/DL (ref 1.5–2.5)
MCH RBC QN AUTO: 30.7 PG (ref 27–33)
MCHC RBC AUTO-ENTMCNC: 33.2 G/DL (ref 33.7–35.3)
MCV RBC AUTO: 92.5 FL (ref 81.4–97.8)
MONOCYTES # BLD AUTO: 0.8 K/UL (ref 0–0.85)
MONOCYTES NFR BLD AUTO: 7.1 % (ref 0–13.4)
NEUTROPHILS # BLD AUTO: 9.64 K/UL (ref 1.82–7.42)
NEUTROPHILS NFR BLD: 85.6 % (ref 44–72)
NRBC # BLD AUTO: 0 K/UL
NRBC BLD-RTO: 0 /100 WBC
O2/TOTAL GAS SETTING VFR VENT: 30 %
PCO2 BLDA: 35.7 MMHG (ref 26–37)
PCO2 BLDA: 39.2 MMHG (ref 26–37)
PCO2 TEMP ADJ BLDA: 36 MMHG (ref 26–37)
PEEP END EXPIRATORY PRESSURE IPEEP: 8 CMH20
PH BLDA: 7.34 [PH] (ref 7.4–7.5)
PH BLDA: 7.36 [PH] (ref 7.4–7.5)
PH TEMP ADJ BLDA: 7.36 [PH] (ref 7.4–7.5)
PHOSPHATE SERPL-MCNC: 2.6 MG/DL (ref 2.5–4.5)
PLATELET # BLD AUTO: 154 K/UL (ref 164–446)
PMV BLD AUTO: 11.5 FL (ref 9–12.9)
PO2 BLDA: 74 MMHG (ref 64–87)
PO2 BLDA: 89 MMHG (ref 64–87)
PO2 TEMP ADJ BLDA: 75 MMHG (ref 64–87)
POTASSIUM SERPL-SCNC: 3.8 MMOL/L (ref 3.6–5.5)
POTASSIUM SERPL-SCNC: 4.3 MMOL/L (ref 3.6–5.5)
RBC # BLD AUTO: 3.87 M/UL (ref 4.7–6.1)
SAO2 % BLDA: 94 % (ref 93–99)
SAO2 % BLDA: 96 % (ref 93–99)
SODIUM SERPL-SCNC: 139 MMOL/L (ref 135–145)
SPECIMEN DRAWN FROM PATIENT: ABNORMAL
SPECIMEN DRAWN FROM PATIENT: ABNORMAL
TIDAL VOLUME IVT: 440 ML
WBC # BLD AUTO: 11.3 K/UL (ref 4.8–10.8)

## 2021-04-07 PROCEDURE — 82803 BLOOD GASES ANY COMBINATION: CPT

## 2021-04-07 PROCEDURE — 83735 ASSAY OF MAGNESIUM: CPT

## 2021-04-07 PROCEDURE — 84132 ASSAY OF SERUM POTASSIUM: CPT

## 2021-04-07 PROCEDURE — 94799 UNLISTED PULMONARY SVC/PX: CPT

## 2021-04-07 PROCEDURE — 94003 VENT MGMT INPAT SUBQ DAY: CPT

## 2021-04-07 PROCEDURE — 700105 HCHG RX REV CODE 258: Performed by: INTERNAL MEDICINE

## 2021-04-07 PROCEDURE — 36600 WITHDRAWAL OF ARTERIAL BLOOD: CPT

## 2021-04-07 PROCEDURE — 99291 CRITICAL CARE FIRST HOUR: CPT | Performed by: INTERNAL MEDICINE

## 2021-04-07 PROCEDURE — 82962 GLUCOSE BLOOD TEST: CPT | Mod: 91

## 2021-04-07 PROCEDURE — 700101 HCHG RX REV CODE 250: Performed by: INTERNAL MEDICINE

## 2021-04-07 PROCEDURE — 302136 NUTRITION PUMP: Performed by: INTERNAL MEDICINE

## 2021-04-07 PROCEDURE — 71045 X-RAY EXAM CHEST 1 VIEW: CPT

## 2021-04-07 PROCEDURE — 94150 VITAL CAPACITY TEST: CPT

## 2021-04-07 PROCEDURE — 700111 HCHG RX REV CODE 636 W/ 250 OVERRIDE (IP): Performed by: HOSPITALIST

## 2021-04-07 PROCEDURE — 86140 C-REACTIVE PROTEIN: CPT

## 2021-04-07 PROCEDURE — A9270 NON-COVERED ITEM OR SERVICE: HCPCS | Performed by: INTERNAL MEDICINE

## 2021-04-07 PROCEDURE — 700111 HCHG RX REV CODE 636 W/ 250 OVERRIDE (IP): Performed by: INTERNAL MEDICINE

## 2021-04-07 PROCEDURE — 700102 HCHG RX REV CODE 250 W/ 637 OVERRIDE(OP): Performed by: INTERNAL MEDICINE

## 2021-04-07 PROCEDURE — A9270 NON-COVERED ITEM OR SERVICE: HCPCS | Performed by: HOSPITALIST

## 2021-04-07 PROCEDURE — 99233 SBSQ HOSP IP/OBS HIGH 50: CPT | Performed by: INTERNAL MEDICINE

## 2021-04-07 PROCEDURE — 770022 HCHG ROOM/CARE - ICU (200)

## 2021-04-07 PROCEDURE — 83036 HEMOGLOBIN GLYCOSYLATED A1C: CPT

## 2021-04-07 PROCEDURE — C9113 INJ PANTOPRAZOLE SODIUM, VIA: HCPCS | Performed by: HOSPITALIST

## 2021-04-07 PROCEDURE — 80048 BASIC METABOLIC PNL TOTAL CA: CPT

## 2021-04-07 PROCEDURE — 85018 HEMOGLOBIN: CPT

## 2021-04-07 PROCEDURE — 85025 COMPLETE CBC W/AUTO DIFF WBC: CPT

## 2021-04-07 PROCEDURE — 84100 ASSAY OF PHOSPHORUS: CPT

## 2021-04-07 PROCEDURE — 700102 HCHG RX REV CODE 250 W/ 637 OVERRIDE(OP): Performed by: HOSPITALIST

## 2021-04-07 RX ORDER — MAGNESIUM SULFATE HEPTAHYDRATE 40 MG/ML
2 INJECTION, SOLUTION INTRAVENOUS ONCE
Status: COMPLETED | OUTPATIENT
Start: 2021-04-07 | End: 2021-04-07

## 2021-04-07 RX ORDER — CLOPIDOGREL BISULFATE 75 MG/1
75 TABLET ORAL DAILY
Status: DISCONTINUED | OUTPATIENT
Start: 2021-04-08 | End: 2021-04-10 | Stop reason: HOSPADM

## 2021-04-07 RX ORDER — FUROSEMIDE 10 MG/ML
20 INJECTION INTRAMUSCULAR; INTRAVENOUS
Status: DISCONTINUED | OUTPATIENT
Start: 2021-04-07 | End: 2021-04-10 | Stop reason: HOSPADM

## 2021-04-07 RX ORDER — FUROSEMIDE 10 MG/ML
20 INJECTION INTRAMUSCULAR; INTRAVENOUS
Status: DISCONTINUED | OUTPATIENT
Start: 2021-04-07 | End: 2021-04-07

## 2021-04-07 RX ORDER — DEXMEDETOMIDINE HYDROCHLORIDE 4 UG/ML
0-1.5 INJECTION, SOLUTION INTRAVENOUS CONTINUOUS
Status: DISCONTINUED | OUTPATIENT
Start: 2021-04-07 | End: 2021-04-08

## 2021-04-07 RX ORDER — POTASSIUM CHLORIDE 7.45 MG/ML
10 INJECTION INTRAVENOUS ONCE
Status: COMPLETED | OUTPATIENT
Start: 2021-04-07 | End: 2021-04-07

## 2021-04-07 RX ADMIN — DEXMEDETOMIDINE 0.2 MCG/KG/HR: 200 INJECTION, SOLUTION INTRAVENOUS at 11:17

## 2021-04-07 RX ADMIN — DEXMEDETOMIDINE 0.5 MCG/KG/HR: 200 INJECTION, SOLUTION INTRAVENOUS at 22:06

## 2021-04-07 RX ADMIN — CLOPIDOGREL BISULFATE 75 MG: 75 TABLET ORAL at 06:10

## 2021-04-07 RX ADMIN — POTASSIUM BICARBONATE 25 MEQ: 978 TABLET, EFFERVESCENT ORAL at 11:17

## 2021-04-07 RX ADMIN — ASPIRIN 81 MG: 81 TABLET, CHEWABLE ORAL at 05:03

## 2021-04-07 RX ADMIN — POTASSIUM CHLORIDE 10 MEQ: 10 INJECTION, SOLUTION INTRAVENOUS at 18:09

## 2021-04-07 RX ADMIN — FUROSEMIDE 20 MG: 10 INJECTION, SOLUTION INTRAMUSCULAR; INTRAVENOUS at 18:09

## 2021-04-07 RX ADMIN — AMIODARONE HYDROCHLORIDE 0.5 MG/MIN: 1.8 INJECTION, SOLUTION INTRAVENOUS at 04:33

## 2021-04-07 RX ADMIN — PROPOFOL 40 MCG/KG/MIN: 10 INJECTION, EMULSION INTRAVENOUS at 08:26

## 2021-04-07 RX ADMIN — NOREPINEPHRINE BITARTRATE 4 MCG/MIN: 1 INJECTION INTRAVENOUS at 14:50

## 2021-04-07 RX ADMIN — AMIODARONE HYDROCHLORIDE 0.5 MG/MIN: 1.8 INJECTION, SOLUTION INTRAVENOUS at 11:17

## 2021-04-07 RX ADMIN — ATORVASTATIN CALCIUM 40 MG: 40 TABLET, FILM COATED ORAL at 18:09

## 2021-04-07 RX ADMIN — PANTOPRAZOLE SODIUM 40 MG: 40 INJECTION, POWDER, FOR SOLUTION INTRAVENOUS at 18:09

## 2021-04-07 RX ADMIN — FUROSEMIDE 20 MG: 10 INJECTION, SOLUTION INTRAMUSCULAR; INTRAVENOUS at 11:16

## 2021-04-07 RX ADMIN — PROPOFOL 30 MCG/KG/MIN: 10 INJECTION, EMULSION INTRAVENOUS at 01:46

## 2021-04-07 RX ADMIN — MAGNESIUM SULFATE 2 G: 2 INJECTION INTRAVENOUS at 08:13

## 2021-04-07 RX ADMIN — DOCUSATE SODIUM 50 MG AND SENNOSIDES 8.6 MG 2 TABLET: 8.6; 5 TABLET, FILM COATED ORAL at 05:03

## 2021-04-07 RX ADMIN — PANTOPRAZOLE SODIUM 40 MG: 40 INJECTION, POWDER, FOR SOLUTION INTRAVENOUS at 05:04

## 2021-04-07 ASSESSMENT — COGNITIVE AND FUNCTIONAL STATUS - GENERAL
DRESSING REGULAR UPPER BODY CLOTHING: TOTAL
CLIMB 3 TO 5 STEPS WITH RAILING: TOTAL
MOBILITY SCORE: 11
STANDING UP FROM CHAIR USING ARMS: A LOT
MOVING FROM LYING ON BACK TO SITTING ON SIDE OF FLAT BED: A LOT
SUGGESTED CMS G CODE MODIFIER MOBILITY: CL
HELP NEEDED FOR BATHING: TOTAL
MOVING TO AND FROM BED TO CHAIR: A LOT
PERSONAL GROOMING: TOTAL
TOILETING: TOTAL
WALKING IN HOSPITAL ROOM: TOTAL
TURNING FROM BACK TO SIDE WHILE IN FLAT BAD: A LITTLE
DAILY ACTIVITIY SCORE: 7
SUGGESTED CMS G CODE MODIFIER DAILY ACTIVITY: CM
EATING MEALS: TOTAL
DRESSING REGULAR LOWER BODY CLOTHING: A LOT

## 2021-04-07 ASSESSMENT — PAIN DESCRIPTION - PAIN TYPE
TYPE: ACUTE PAIN

## 2021-04-07 ASSESSMENT — FIBROSIS 4 INDEX: FIB4 SCORE: 8.14

## 2021-04-07 ASSESSMENT — PULMONARY FUNCTION TESTS: FVC: 1

## 2021-04-07 ASSESSMENT — PATIENT HEALTH QUESTIONNAIRE - PHQ9
1. LITTLE INTEREST OR PLEASURE IN DOING THINGS: NOT AT ALL
SUM OF ALL RESPONSES TO PHQ9 QUESTIONS 1 AND 2: 0
2. FEELING DOWN, DEPRESSED, IRRITABLE, OR HOPELESS: NOT AT ALL

## 2021-04-07 NOTE — PROGRESS NOTES
Critical Care Progress Note    Date of admission  2021    Chief Complaint  VT/VF cardiac arrest    Hospital Course  76 y.o. male, unknown PMH, presented 2021 to the emergency department via EMS with chest pain and shortness of breath.  He was awake and alert when he was initially admitted to the ED.  Apparently he has had chest pain and shortness of breath off and on for the last 3 days.  Shortly after being admitted to the ED, he complained of sharp chest pain and became unresponsive.  He was briefly in ventricular tachycardia and then had ventricular fibrillation.  Emergent resuscitation was initiated with CPR, IV epinephrine, IV amiodarone, IV calcium and IV bicarbonate solution.  He had mottling from the mid chest superiorly and there was a concern that he may have an acute massive pulmonary embolism.  He was empirically administered 50 mg of IV alteplase.  ROSC was achieved after approximately 18 minutes.  A second 50 mg dose of IV alteplase was administered per protocol.  After ROSC, he was in atrial fibrillation.  He ultimately developed purposeful movements as he was trying to reach up for his endotracheal tube with his hands and trying to sit up.  He was subsequently sedated for his comfort.  An orogastric tube was placed and bright red blood was initially aspirated from his stomach.  His intubation was atraumatic and no blood was noticed in his oropharynx at the time of intubation    Interval Problem Update  Reviewed last 24 hour events:  POD#1 stents  Not following; moves all ext and purposful  Failed SAT - agitated and   SR  Levo 4  amio 0.5  Tc 37.2, on flaco hugger  NPO, GIB after tpa  NGT to LWS, minimal o/p  Some tooth trauma and bleeding from intubatin  VD #2: 20/430/8/40  AB.36/36/75  Bloody secretions  CXR min int edema, lines OK  I/O = 3.99/1.2  DAPT  PPI  Replace Mg  Change to dex  Add lasix  Start TF    Review of Systems  Review of Systems   Unable to perform ROS: Intubated         Vital Signs for last 24 hours   Temp:  [34.8 °C (94.6 °F)-37.2 °C (99 °F)] 37.2 °C (99 °F)  Pulse:  [65-97] 88  Resp:  [16-32] 18  BP: ()/(47-93) 101/47  SpO2:  [94 %-100 %] 97 %    Hemodynamic parameters for last 24 hours  CVP:  [7 MM HG-256 MM HG] 256 MM HG    Respiratory Information for the last 24 hours  Vent Mode: APVCMV  Rate (breaths/min): 20  Vt Target (mL): 440  PEEP/CPAP: 8  MAP: 11  Control VTE (exp VT): 446    Physical Exam   Physical Exam  Vitals and nursing note reviewed.   Constitutional:       Comments: Sedated, full ventilator support   HENT:      Head: Normocephalic.      Nose: Nose normal.      Mouth/Throat:      Mouth: Mucous membranes are moist.      Comments: Blood in mouth  Eyes:      Pupils: Pupils are equal, round, and reactive to light.   Cardiovascular:      Rate and Rhythm: Normal rate and regular rhythm.   Pulmonary:      Comments: Intubated, full ventilator support, mildly diminished breath sounds, no wheeze, some bloody secretions from ETT  Abdominal:      General: There is no distension.      Palpations: Abdomen is soft.   Musculoskeletal:         General: No swelling or deformity. Normal range of motion.      Cervical back: Neck supple.   Skin:     General: Skin is dry.      Capillary Refill: Capillary refill takes 2 to 3 seconds.   Neurological:      Comments: Sedation changed to dexmedetomidine, patient now following commands and moving all extremities         Medications  Current Facility-Administered Medications   Medication Dose Route Frequency Provider Last Rate Last Admin   • Pharmacy Consult: Enteral tube insertion - review meds/change route/product selection   Other PHARMACY TO DOSE Vladimir Basurto M.D.       • [START ON 4/8/2021] clopidogrel (PLAVIX) tablet 75 mg  75 mg Enteral Tube DAILY Vladimir Basurto M.D.       • magnesium sulfate IVPB premix 2 g  2 g Intravenous Once Primitivo White M.D. 25 mL/hr at 04/07/21 0813 2 g at 04/07/21 0813   • HYDROmorphone  (Dilaudid) injection 0.5-2 mg  0.5-2 mg Intravenous Q HOUR PRN Natanael Montejo M.D.   1 mg at 04/06/21 1022   • propofol (DIPRIVAN) injection  0-80 mcg/kg/min Intravenous Continuous Natanael Montejo M.D. 15.7 mL/hr at 04/07/21 0916 35 mcg/kg/min at 04/07/21 0916   • Respiratory Therapy Consult   Nebulization Continuous RT Natanael Montejo M.D.       • senna-docusate (PERICOLACE or SENOKOT S) 8.6-50 MG per tablet 2 tablet  2 tablet Enteral Tube BID Natanael Montejo M.D.   2 tablet at 04/07/21 0503    And   • polyethylene glycol/lytes (MIRALAX) PACKET 1 Packet  1 Packet Enteral Tube QDAY PRN Natanael Montejo M.D.        And   • magnesium hydroxide (MILK OF MAGNESIA) suspension 30 mL  30 mL Enteral Tube QDAY PRN Natanael Montejo M.D.        And   • bisacodyl (DULCOLAX) suppository 10 mg  10 mg Rectal QDAY PRN Natanael Montejo M.D.       • MD Alert...ICU Electrolyte Replacement per Pharmacy   Other PHARMACY TO DOSE Natanael Montejo M.D.       • lidocaine (XYLOCAINE) 1 % injection 1-2 mL  1-2 mL Tracheal Tube Q30 MIN PRN Natanael Montejo M.D.       • ipratropium-albuterol (DUONEB) nebulizer solution  3 mL Nebulization Q2HRS PRN (RT) Natanael Montejo M.D.       • norepinephrine (Levophed) infusion 8 mg/250 mL (premix)  0-30 mcg/min Intravenous Continuous Natanael Montejo M.D. 9.4 mL/hr at 04/07/21 0909 5 mcg/min at 04/07/21 0909   • acetaminophen (Tylenol) tablet 650 mg  650 mg Enteral Tube Q6HRS PRN Dejan Peres D.O.       • ondansetron (ZOFRAN) syringe/vial injection 4 mg  4 mg Intravenous Q4HRS PRN Dejan Peres D.O.       • ondansetron (ZOFRAN ODT) dispertab 4 mg  4 mg Enteral Tube Q4HRS PRN Dejan Peres D.O.       • amiodarone (NEXTERONE) 360 mg/200 mL infusion  0.5-1 mg/min Intravenous Continuous Natanael Montejo M.D. 17 mL/hr at 04/07/21 0433 0.5 mg/min at 04/07/21 0433   • insulin regular (HumuLIN R,NovoLIN R)  injection  1-6 Units Subcutaneous Q6HRS Natanael Montejo M.D.   Stopped at 04/06/21 1200    And   • dextrose 50% (D50W) injection 50 mL  50 mL Intravenous Q15 MIN PRN Natanael Montejo M.D.       • pantoprazole (PROTONIX) injection 40 mg  40 mg Intravenous BID SADE Puente.O.   40 mg at 04/07/21 0504   • atorvastatin (LIPITOR) tablet 40 mg  40 mg Enteral Tube Q EVENING Dejan Peres D.O.   40 mg at 04/06/21 1847   • K+ Scale: Goal of 4.5  1 Each Intravenous Q6HRS Natanael Montejo M.D.   Stopped at 04/06/21 1800   • NS infusion   Intravenous Continuous Raffaele Rosen M.D.   Stopped at 04/06/21 2200   • aspirin (ASA) chewable tab 81 mg  81 mg Enteral Tube DAILY Raffaele Rosen M.D.   81 mg at 04/07/21 0503       Fluids    Intake/Output Summary (Last 24 hours) at 4/7/2021 0924  Last data filed at 4/7/2021 0800  Gross per 24 hour   Intake 1991.15 ml   Output 1500 ml   Net 491.15 ml       Laboratory  Recent Labs     04/06/21  1202 04/06/21  1202 04/06/21  1417 04/06/21  1545 04/07/21  0304   ISTATAPH 7.472   < > 7.499 7.342* 7.362*   ISTATAPCO2 28.8   < > 27.4 39.2* 35.7   ISTATAPO2 140*   < > 98* 89* 74   ISTATATCO2 22   < > 22 22 21   LYYSWPU8QWI 99   < > 98 96 94   ISTATARTHCO3 21.1   < > 21.3 21.2 20.2   ISTATARTBE -3   < > -2 -4 -5*   ISTATTEMP 34.9 C   < > 35.5 C see below 37.2 C   ISTATFIO2 60  --  30  --  30   ISTATSPEC Arterial   < > Arterial Arterial Arterial   ISTATAPHTC 7.504*  --  7.522*  --  7.359*   QURTKYKE0LZ 127*  --  89*  --  75    < > = values in this interval not displayed.         Recent Labs     04/06/21  0759 04/06/21  0902 04/06/21  1543 04/06/21  1543 04/06/21  1720 04/06/21  2304 04/07/21  0428   SODIUM   < >  --  140  --   --  138 139   POTASSIUM   < >  --  4.4   < > 4.4 4.3 4.3   CHLORIDE   < >  --  111  --   --  109 111   CO2   < >  --  23  --   --  20 20   BUN   < >  --  16  --   --  12 12   CREATININE   < >  --  1.09  --   --  1.06 1.12   MAGNESIUM   --  2.1  --   --   --   --  1.7   PHOSPHORUS  --   --   --   --   --   --  2.6   CALCIUM   < >  --  8.3*  --   --  8.1* 8.1*    < > = values in this interval not displayed.     Recent Labs     04/06/21 0759 04/06/21 0759 04/06/21  1543 04/06/21  2304 04/07/21  0428   ALTSGPT 27  --  40  --   --    ASTSGOT 25  --  103*  --   --    ALKPHOSPHAT 92  --  72  --   --    TBILIRUBIN 0.3  --  0.6  --   --    GLUCOSE 224*   < > 124* 99 108*    < > = values in this interval not displayed.     Recent Labs     04/06/21 0759 04/06/21 1543 04/06/21 2304 04/07/21 0428   WBC 7.6  --  9.0 11.3*   NEUTSPOLYS 30.00*  --   --  85.60*   LYMPHOCYTES 59.00*  --   --  6.10*   MONOCYTES 2.00  --   --  7.10   EOSINOPHILS 3.00  --   --  0.40   BASOPHILS 0.00  --   --  0.20   ASTSGOT 25 103*  --   --    ALTSGPT 27 40  --   --    ALKPHOSPHAT 92 72  --   --    TBILIRUBIN 0.3 0.6  --   --      Recent Labs     04/06/21 0759 04/06/21 0759 04/06/21  0810 04/06/21  1720 04/06/21 2304 04/07/21 0428   RBC 4.69*  --   --   --  3.92* 3.87*   HEMOGLOBIN 14.5   < >  --  11.7* 12.0* 11.9*   HEMATOCRIT 45.4  --   --   --  35.6* 35.8*   PLATELETCT 177  --   --   --  166 154*   PROTHROMBTM  --   --  17.1*  --   --   --    APTT  --   --  37.5*  --   --   --    INR  --   --  1.35*  --   --   --     < > = values in this interval not displayed.       Imaging  X-Ray:  I have personally reviewed the images and compared with prior images.    Assessment/Plan  Acute respiratory failure with hypoxia (HCC)  Assessment & Plan  Intubated 4/6  I have initiated all the appropriate ventilator bundles  Full vent support    Cardiac arrest (HCC)  Assessment & Plan  Ventricular fibrillation cardiac arrest in ED  ROSC with CPR, epinephrine, amiodarone, calcium, bicarbonate solution  Empirically given 100 mg of IV alteplase during resuscitation for presumed acute pulmonary embolism  Purposeful movements following ROSC - not a candidate for therapeutic hypothermia  ECG with  no evidence of acute STEMI  Start amiodarone drip  Stat echocardiogram  Optimize potassium and magnesium  Cardiology consultation    Hyperglycemia  Assessment & Plan  Check glycohemoglobin  Sliding scale insulin    Upper gastrointestinal bleed  Assessment & Plan  Bloody NG aspirate - query related to alteplase  IV PPI twice daily  Trend hemoglobin    AF (atrial fibrillation) (HCC)  Assessment & Plan  Paroxysmal atrial fibrillation in ED  Check thyroid function  Echocardiogram  Optimize potassium and magnesium    Hypokalemia  Assessment & Plan  Replete potassium       Update:   Full ventilator support, not yet ready for liberation  Try changing sedation to dexmedetomidine today  Titrated vasopressors  Add Lasix  Replete electrolytes  Continue DAPT, monitor bleeding from mouth, suspected intubation trauma  Initiate enteral nutrition    VTE:  Contraindicated  Ulcer: PPI  Lines: Central Line  Ongoing indication addressed    I have performed a physical exam and reviewed and updated ROS and Plan today (4/7/2021). In review of yesterday's note (4/6/2021), there are no changes except as documented above.     Discussed patient condition and risk of morbidity and/or mortality with RN, RT, Pharmacy and QA team  The patient remains critically ill.  Critical care time = 35 minutes in directly providing and coordinating critical care and extensive data review.  No time overlap and excludes procedures.

## 2021-04-07 NOTE — THERAPY
Contact Therapy Note    Patient Name: Ian Condon  Age:  76 y.o., Sex:  male  Medical Record #: [id number]  Today's Date: 4/7/2021 04/07/21 0800   Treatment Variance   Reason For Missed Therapy Medical - Patient on Hold from Therapy   Interdisciplinary Plan of Care Collaboration   IDT Collaboration with  Other (See Comments)  (Chart)   Collaboration Comments PT orders recieved and chart reviewed. Per order, pt to be seen by PT POD#2. PT to follow up when appropriate.

## 2021-04-07 NOTE — PROGRESS NOTES
1st TR band check at 1830- 3cc removed    1845- 3 cc removed    1900- 3 cc removed    9 cc removed total, patient tolerated well, no bleeding from site, report given to Al CHAVEZ, patient with 4 cc left in TR band.

## 2021-04-07 NOTE — CARE PLAN
Problem: Safety - Medical Restraint  Goal: Remains free of injury from restraints (Restraint for Interference with Medical Device)  Description: INTERVENTIONS:  1. Determine that other, less restrictive measures have been tried or would not be effective before applying the restraint  2. Evaluate the patient's condition at the time of restraint application  3. Inform patient/family regarding the reason for restraint  4. Q2H: Monitor safety, psychosocial status, comfort, nutrition and hydration  Flowsheets (Taken 4/7/2021 9565)  Addressed this shift: Remains free of injury from restraints (restraint for interference with medical device):   Determine that other, less restrictive measures have been tried or would not be effective before applying the restraint   Evaluate the patient's condition at the time of restraint application   Inform patient/family regarding the reason for restraint   Every 2 hours: Monitor safety, psychosocial status, comfort, nutrition and hydration

## 2021-04-07 NOTE — PROGRESS NOTES
Cortrak Placement    Tube Team verified patient name and medical record number prior to tube placement.  Cortrak tube (43 inches, 12 Libyan) placed at 80 cm in right nare.  Per Cortrak picture, tube appears to be in the stomach.  Nursing Instructions: Awaiting KUB to confirm placement before use for medications or feeding. Once placement confirmed, flush tube with 30 ml of water, and then remove and save stylet, in patient medication drawer.

## 2021-04-07 NOTE — FLOWSHEET NOTE
04/07/21 1548   Spontaneous Breathing Trial (SBT)   Length of Weaning Trial (Hours) 1   Pt passed SBT but not ready to extubate Not ready - continue daily assessments for extubation   Weaning Parameters   RR (bpm) 14   $ FVC / Vital Capacity (liters)  1   NIF (cm H2O)  -10   Rapid Shallow Breathing Index (RR/VT) 19   Spontaneous VE 8.9   Spontaneous

## 2021-04-07 NOTE — RESPIRATORY CARE
Ventilator Daily Summary    Vent Day # 2    Ventilator settings changed this shift: 20 430 +8 30%    Weaning trials: yes x1    Respiratory Procedures: none     Plan: Continue current ventilator settings and wean mechanical ventilation as tolerated per physician orders.

## 2021-04-07 NOTE — PROGRESS NOTES
2 RN skin check complete with Tamara Sorto RN, patient has an abrasion to his nose (purple with a scab),   Elbows, heels, coccyx unremarkable for breakdown,  Pillows in use for support, q2 hr turns.

## 2021-04-07 NOTE — DIETARY
"Nutrition Support Assessment:  Day 1 of admit.  Ian Condon is a 76 y.o. male with admitting DX of Cardiac arrest (HCC)     Current problem list:  1. Pt was admitted  with CP. V-fib arrest in ED. Intubated. S/p cardiac cath with PCIs.     Assessment:  Estimated Nutritional Needs based on:   Height: 177.8 cm (5' 10\")  Weight: 76.8 kg (169 lb 5 oz)  Ideal Body Weight: 75.3 kg (166 lb)  Percent Ideal Body Weight: 102  Body mass index is 24.29 kg/m²., BMI classification: Normal    Calculation/Equation: REE per MSJ x1.2 = 1801 kcal/day; RMR per [facility] (vent L/min 8.7, T max/24 hours 37.2) = 1710 kcal/day  Total Calories / day: 1700 - 1900 (Calories / k - 25)  Total Grams Protein / day: ~115 (Grams Protein / kg: ~1.5)     Evaluation:   1. Pt is intubated and unable to take PO diet.  2. Noted OGT in place with dried blood in/around mouth.  3. Orders to start TF.   4. Increased nutritional needs based on critical illness and RD assessment of at least moderate malnutrition based on NFPE (see below). Will order metabolic cart study per RT.  5. Labs reviewed. Of note, HA1c and lipid panel WNL. Likely no indication for specific cardiac rehab diet education.   6. Meds include lasix, insulin, K+ scale, electrolyte replacement protocol, levophed @ 5 mcg/min, and bowel regimen. Propofol changing to precedex per MAR.  7. Last BM PTA.  8. No significant wounds noted.  9. Nutrient-dense TF formula will best meet nutritional needs.      Malnutrition Risk: Mild/moderate muscle wasting temporal region; moderate/severe muscle wasting thigh region.      Recommendations/Plan:  1. Start Impact Peptide 1.5 @ 25 ml/hr and advance per protocol to goal rate 50 ml/hr to provide 1800 kcal, 113 grams protein, and 924 ml free water per day.  2. Fluids per MD.  3. Metabolic cart study per RT.    RD following.   "

## 2021-04-07 NOTE — PROGRESS NOTES
Patient back from cath lab, report received from Keyonna CHAVEZ, gtts verified, ekg ordered, TR band with 13 cc via right radial approach,

## 2021-04-07 NOTE — PROGRESS NOTES
2 RN skin check completed w/ Levi RN:     Abrasion on nasal bridge present.   Skin bruised/fragile throughout.   Oral cavity bleeding- frequent oral suctioning and oral hygiene performed.   Sacrum red but blanching

## 2021-04-07 NOTE — CARE PLAN
Problem: Respiratory:  Goal: Respiratory status will improve  Note: Collaboration w/ RT. Continuous O2 monitoring in place. Patient suctioned and oral hygiene performed multiple times per hour. Lung sounds assessed and documented      Problem: Skin Integrity  Goal: Risk for impaired skin integrity will decrease  Note: Patient turned Q2h, pillows in use for positioning. Oral cavity bleeding- frequent oral suctioning and hygiene performed. Skin assessed and documented- fragile/bruising throughout

## 2021-04-08 ENCOUNTER — APPOINTMENT (OUTPATIENT)
Dept: RADIOLOGY | Facility: MEDICAL CENTER | Age: 76
DRG: 246 | End: 2021-04-08
Attending: INTERNAL MEDICINE
Payer: MEDICARE

## 2021-04-08 LAB
ANION GAP SERPL CALC-SCNC: 9 MMOL/L (ref 7–16)
BASE EXCESS BLDA CALC-SCNC: 0 MMOL/L (ref -4–3)
BASOPHILS # BLD AUTO: 0.3 % (ref 0–1.8)
BASOPHILS # BLD: 0.04 K/UL (ref 0–0.12)
BODY TEMPERATURE: NORMAL DEGREES
BREATHS SETTING VENT: 20
BUN SERPL-MCNC: 16 MG/DL (ref 8–22)
CALCIUM SERPL-MCNC: 8.4 MG/DL (ref 8.5–10.5)
CHLORIDE SERPL-SCNC: 111 MMOL/L (ref 96–112)
CO2 BLDA-SCNC: 24 MMOL/L (ref 20–33)
CO2 SERPL-SCNC: 23 MMOL/L (ref 20–33)
CREAT SERPL-MCNC: 1.14 MG/DL (ref 0.5–1.4)
CRP SERPL HS-MCNC: 16.56 MG/DL (ref 0–0.75)
DELSYS IDSYS: NORMAL
END TIDAL CARBON DIOXIDE IECO2: 30 MMHG
EOSINOPHIL # BLD AUTO: 0.16 K/UL (ref 0–0.51)
EOSINOPHIL NFR BLD: 1.2 % (ref 0–6.9)
ERYTHROCYTE [DISTWIDTH] IN BLOOD BY AUTOMATED COUNT: 46.9 FL (ref 35.9–50)
GLUCOSE BLD-MCNC: 110 MG/DL (ref 65–99)
GLUCOSE BLD-MCNC: 111 MG/DL (ref 65–99)
GLUCOSE BLD-MCNC: 114 MG/DL (ref 65–99)
GLUCOSE SERPL-MCNC: 135 MG/DL (ref 65–99)
HCO3 BLDA-SCNC: 22.7 MMOL/L (ref 17–25)
HCT VFR BLD AUTO: 36 % (ref 42–52)
HGB BLD-MCNC: 12.1 G/DL (ref 14–18)
HGB BLD-MCNC: 12.1 G/DL (ref 14–18)
HGB BLD-MCNC: 12.3 G/DL (ref 14–18)
HOROWITZ INDEX BLDA+IHG-RTO: 243 MM[HG]
IMM GRANULOCYTES # BLD AUTO: 0.06 K/UL (ref 0–0.11)
IMM GRANULOCYTES NFR BLD AUTO: 0.4 % (ref 0–0.9)
LYMPHOCYTES # BLD AUTO: 1.08 K/UL (ref 1–4.8)
LYMPHOCYTES NFR BLD: 8 % (ref 22–41)
MAGNESIUM SERPL-MCNC: 2.1 MG/DL (ref 1.5–2.5)
MCH RBC QN AUTO: 30.4 PG (ref 27–33)
MCHC RBC AUTO-ENTMCNC: 33.6 G/DL (ref 33.7–35.3)
MCV RBC AUTO: 90.5 FL (ref 81.4–97.8)
MONOCYTES # BLD AUTO: 0.94 K/UL (ref 0–0.85)
MONOCYTES NFR BLD AUTO: 6.9 % (ref 0–13.4)
NEUTROPHILS # BLD AUTO: 11.29 K/UL (ref 1.82–7.42)
NEUTROPHILS NFR BLD: 83.2 % (ref 44–72)
NRBC # BLD AUTO: 0 K/UL
NRBC BLD-RTO: 0 /100 WBC
O2/TOTAL GAS SETTING VFR VENT: 30 %
PCO2 BLDA: 31 MMHG (ref 26–37)
PCO2 TEMP ADJ BLDA: 32.8 MMHG (ref 26–37)
PEEP END EXPIRATORY PRESSURE IPEEP: 8 CMH20
PH BLDA: 7.47 [PH] (ref 7.4–7.5)
PH TEMP ADJ BLDA: 7.45 [PH] (ref 7.4–7.5)
PHOSPHATE SERPL-MCNC: 2.1 MG/DL (ref 2.5–4.5)
PLATELET # BLD AUTO: 154 K/UL (ref 164–446)
PMV BLD AUTO: 11.2 FL (ref 9–12.9)
PO2 BLDA: 73 MMHG (ref 64–87)
PO2 TEMP ADJ BLDA: 79 MMHG (ref 64–87)
POTASSIUM SERPL-SCNC: 3.5 MMOL/L (ref 3.6–5.5)
POTASSIUM SERPL-SCNC: 4 MMOL/L (ref 3.6–5.5)
POTASSIUM SERPL-SCNC: 4 MMOL/L (ref 3.6–5.5)
POTASSIUM SERPL-SCNC: 4.1 MMOL/L (ref 3.6–5.5)
PREALB SERPL-MCNC: 16.5 MG/DL (ref 18–38)
RBC # BLD AUTO: 3.98 M/UL (ref 4.7–6.1)
SAO2 % BLDA: 96 % (ref 93–99)
SODIUM SERPL-SCNC: 143 MMOL/L (ref 135–145)
SPECIMEN DRAWN FROM PATIENT: NORMAL
TIDAL VOLUME IVT: 440 ML
TRIGL SERPL-MCNC: 72 MG/DL (ref 0–149)
WBC # BLD AUTO: 13.6 K/UL (ref 4.8–10.8)

## 2021-04-08 PROCEDURE — 700102 HCHG RX REV CODE 250 W/ 637 OVERRIDE(OP): Performed by: INTERNAL MEDICINE

## 2021-04-08 PROCEDURE — 82803 BLOOD GASES ANY COMBINATION: CPT

## 2021-04-08 PROCEDURE — 94150 VITAL CAPACITY TEST: CPT

## 2021-04-08 PROCEDURE — 700111 HCHG RX REV CODE 636 W/ 250 OVERRIDE (IP): Performed by: HOSPITALIST

## 2021-04-08 PROCEDURE — 37799 UNLISTED PX VASCULAR SURGERY: CPT

## 2021-04-08 PROCEDURE — A9270 NON-COVERED ITEM OR SERVICE: HCPCS | Performed by: INTERNAL MEDICINE

## 2021-04-08 PROCEDURE — 85018 HEMOGLOBIN: CPT | Mod: 91

## 2021-04-08 PROCEDURE — 770022 HCHG ROOM/CARE - ICU (200)

## 2021-04-08 PROCEDURE — 82962 GLUCOSE BLOOD TEST: CPT | Mod: 91

## 2021-04-08 PROCEDURE — C9113 INJ PANTOPRAZOLE SODIUM, VIA: HCPCS | Performed by: HOSPITALIST

## 2021-04-08 PROCEDURE — A9270 NON-COVERED ITEM OR SERVICE: HCPCS | Performed by: HOSPITALIST

## 2021-04-08 PROCEDURE — 84478 ASSAY OF TRIGLYCERIDES: CPT

## 2021-04-08 PROCEDURE — 80048 BASIC METABOLIC PNL TOTAL CA: CPT

## 2021-04-08 PROCEDURE — 94003 VENT MGMT INPAT SUBQ DAY: CPT

## 2021-04-08 PROCEDURE — 84132 ASSAY OF SERUM POTASSIUM: CPT

## 2021-04-08 PROCEDURE — 99233 SBSQ HOSP IP/OBS HIGH 50: CPT | Performed by: INTERNAL MEDICINE

## 2021-04-08 PROCEDURE — 84134 ASSAY OF PREALBUMIN: CPT

## 2021-04-08 PROCEDURE — 85025 COMPLETE CBC W/AUTO DIFF WBC: CPT

## 2021-04-08 PROCEDURE — 700111 HCHG RX REV CODE 636 W/ 250 OVERRIDE (IP): Performed by: INTERNAL MEDICINE

## 2021-04-08 PROCEDURE — 83735 ASSAY OF MAGNESIUM: CPT

## 2021-04-08 PROCEDURE — 700102 HCHG RX REV CODE 250 W/ 637 OVERRIDE(OP): Performed by: HOSPITALIST

## 2021-04-08 PROCEDURE — 700105 HCHG RX REV CODE 258: Performed by: INTERNAL MEDICINE

## 2021-04-08 PROCEDURE — 84100 ASSAY OF PHOSPHORUS: CPT

## 2021-04-08 PROCEDURE — 94799 UNLISTED PULMONARY SVC/PX: CPT

## 2021-04-08 PROCEDURE — 99291 CRITICAL CARE FIRST HOUR: CPT | Performed by: INTERNAL MEDICINE

## 2021-04-08 PROCEDURE — 700101 HCHG RX REV CODE 250: Performed by: INTERNAL MEDICINE

## 2021-04-08 PROCEDURE — 71045 X-RAY EXAM CHEST 1 VIEW: CPT

## 2021-04-08 RX ORDER — HYDROMORPHONE HYDROCHLORIDE 1 MG/ML
.5-1 INJECTION, SOLUTION INTRAMUSCULAR; INTRAVENOUS; SUBCUTANEOUS
Status: DISCONTINUED | OUTPATIENT
Start: 2021-04-08 | End: 2021-04-10 | Stop reason: HOSPADM

## 2021-04-08 RX ORDER — POTASSIUM CHLORIDE 7.45 MG/ML
10 INJECTION INTRAVENOUS ONCE
Status: COMPLETED | OUTPATIENT
Start: 2021-04-08 | End: 2021-04-08

## 2021-04-08 RX ORDER — POTASSIUM CHLORIDE 7.45 MG/ML
10 INJECTION INTRAVENOUS ONCE
Status: DISPENSED | OUTPATIENT
Start: 2021-04-08 | End: 2021-04-09

## 2021-04-08 RX ORDER — LIDOCAINE 50 MG/G
1 PATCH TOPICAL EVERY 24 HOURS
Status: DISCONTINUED | OUTPATIENT
Start: 2021-04-08 | End: 2021-04-09

## 2021-04-08 RX ORDER — POTASSIUM CHLORIDE 14.9 MG/ML
20 INJECTION INTRAVENOUS ONCE
Status: COMPLETED | OUTPATIENT
Start: 2021-04-08 | End: 2021-04-08

## 2021-04-08 RX ADMIN — PANTOPRAZOLE SODIUM 40 MG: 40 INJECTION, POWDER, FOR SOLUTION INTRAVENOUS at 17:37

## 2021-04-08 RX ADMIN — DEXMEDETOMIDINE 1.2 MCG/KG/HR: 200 INJECTION, SOLUTION INTRAVENOUS at 01:15

## 2021-04-08 RX ADMIN — POTASSIUM CHLORIDE 20 MEQ: 14.9 INJECTION, SOLUTION INTRAVENOUS at 15:06

## 2021-04-08 RX ADMIN — FUROSEMIDE 20 MG: 10 INJECTION, SOLUTION INTRAMUSCULAR; INTRAVENOUS at 05:24

## 2021-04-08 RX ADMIN — HYDROMORPHONE HYDROCHLORIDE 0.5 MG: 1 INJECTION, SOLUTION INTRAMUSCULAR; INTRAVENOUS; SUBCUTANEOUS at 13:22

## 2021-04-08 RX ADMIN — ATORVASTATIN CALCIUM 40 MG: 40 TABLET, FILM COATED ORAL at 17:30

## 2021-04-08 RX ADMIN — SODIUM PHOSPHATE, MONOBASIC, MONOHYDRATE AND SODIUM PHOSPHATE, DIBASIC, ANHYDROUS 15 MMOL: 276; 142 INJECTION, SOLUTION INTRAVENOUS at 08:58

## 2021-04-08 RX ADMIN — LIDOCAINE 1 PATCH: 50 PATCH TOPICAL at 13:23

## 2021-04-08 RX ADMIN — CLOPIDOGREL BISULFATE 75 MG: 75 TABLET ORAL at 05:24

## 2021-04-08 RX ADMIN — ACETAMINOPHEN 650 MG: 325 TABLET, FILM COATED ORAL at 23:06

## 2021-04-08 RX ADMIN — POTASSIUM CHLORIDE 10 MEQ: 7.46 INJECTION, SOLUTION INTRAVENOUS at 19:40

## 2021-04-08 RX ADMIN — FUROSEMIDE 20 MG: 10 INJECTION, SOLUTION INTRAMUSCULAR; INTRAVENOUS at 17:31

## 2021-04-08 RX ADMIN — PANTOPRAZOLE SODIUM 40 MG: 40 INJECTION, POWDER, FOR SOLUTION INTRAVENOUS at 05:39

## 2021-04-08 RX ADMIN — POTASSIUM BICARBONATE 25 MEQ: 978 TABLET, EFFERVESCENT ORAL at 05:24

## 2021-04-08 RX ADMIN — HYDROMORPHONE HYDROCHLORIDE 1 MG: 1 INJECTION, SOLUTION INTRAMUSCULAR; INTRAVENOUS; SUBCUTANEOUS at 17:44

## 2021-04-08 RX ADMIN — ASPIRIN 81 MG: 81 TABLET, CHEWABLE ORAL at 05:24

## 2021-04-08 RX ADMIN — DOCUSATE SODIUM 50 MG AND SENNOSIDES 8.6 MG 2 TABLET: 8.6; 5 TABLET, FILM COATED ORAL at 05:24

## 2021-04-08 RX ADMIN — POTASSIUM CHLORIDE 10 MEQ: 7.46 INJECTION, SOLUTION INTRAVENOUS at 01:04

## 2021-04-08 RX ADMIN — HYDROMORPHONE HYDROCHLORIDE 1 MG: 1 INJECTION, SOLUTION INTRAMUSCULAR; INTRAVENOUS; SUBCUTANEOUS at 15:23

## 2021-04-08 ASSESSMENT — FIBROSIS 4 INDEX: FIB4 SCORE: 8.14

## 2021-04-08 ASSESSMENT — PAIN DESCRIPTION - PAIN TYPE
TYPE: ACUTE PAIN

## 2021-04-08 ASSESSMENT — COPD QUESTIONNAIRES
DO YOU EVER COUGH UP ANY MUCUS OR PHLEGM?: NO/ONLY WITH OCCASIONAL COLDS OR INFECTIONS
HAVE YOU SMOKED AT LEAST 100 CIGARETTES IN YOUR ENTIRE LIFE: YES
DURING THE PAST 4 WEEKS HOW MUCH DID YOU FEEL SHORT OF BREATH: NONE/LITTLE OF THE TIME
COPD SCREENING SCORE: 4

## 2021-04-08 ASSESSMENT — PULMONARY FUNCTION TESTS: FVC: 1.56

## 2021-04-08 NOTE — DISCHARGE PLANNING
Care Transition Team Discharge Planning    Anticipated Discharge Disposition: TBD    Action: Per AM rounds, pt admitted for chest pain and shortness of breath. Pt able to give yes/no answers, and moves all extremities. Pt tongued out his cortrak this AM. Pt is on vent day 3. Pt will be extubated today post rounds. Pt has thick secretions which are difficult to suction.     Barriers to Discharge: TBD    Plan: Lsw will continue to follow, and assist w/ d/c planning as needed.

## 2021-04-08 NOTE — CARE PLAN
Respiratory Therapy Update       Cough: Productive    Sputum Amount: Large    Sputum Color: Brown     Sputum Consistency: Thick        O2 (LPM): 3 (04/08/21 1035)       Breath Sounds  RUL Breath Sounds: Diminished   RML Breath Sounds: Diminished   RLL Breath Sounds: Diminished   SHAQ Breath Sounds: Diminished   LLL Breath Sounds: Diminished      Events/Summary/Plan: PT extubated

## 2021-04-08 NOTE — PROGRESS NOTES
Cortrak Placement    Tube Team verified patient name and medical record number prior to tube placement.  Cortrak tube (55 inches, 10 Qat[place]) placed at 60 cm in left nare.  Per Cortrak picture, tube appears to be in the stomach.  Nursing Instructions: Awaiting KUB to confirm placement before use for medications or feeding. Once placement confirmed, flush tube with 30 ml of water, and then remove and save stylet, in patient medication drawer.

## 2021-04-08 NOTE — PROGRESS NOTES
Critical Care Progress Note    Date of admission  4/6/2021    Chief Complaint  VT/VF cardiac arrest    Hospital Course  76 y.o. male, unknown PMH, presented 4/6/2021 to the emergency department via EMS with chest pain and shortness of breath.  He was awake and alert when he was initially admitted to the ED.  Apparently he has had chest pain and shortness of breath off and on for the last 3 days.  Shortly after being admitted to the ED, he complained of sharp chest pain and became unresponsive.  He was briefly in ventricular tachycardia and then had ventricular fibrillation.  Emergent resuscitation was initiated with CPR, IV epinephrine, IV amiodarone, IV calcium and IV bicarbonate solution.  He had mottling from the mid chest superiorly and there was a concern that he may have an acute massive pulmonary embolism.  He was empirically administered 50 mg of IV alteplase.  ROSC was achieved after approximately 18 minutes.  A second 50 mg dose of IV alteplase was administered per protocol.  After ROSC, he was in atrial fibrillation.  He ultimately developed purposeful movements as he was trying to reach up for his endotracheal tube with his hands and trying to sit up.  He was subsequently sedated for his comfort.  An orogastric tube was placed and bright red blood was initially aspirated from his stomach.  His intubation was atraumatic and no blood was noticed in his oropharynx at the time of intubation    Interval Problem Update  Reviewed last 24 hour events:  WBC 13.6  .3  Norepinephrine 5  Dexmedetomidine  VD# 3  koby SBT, RSBI 14  Old bloody secretion   SAT, follows moves all ext  SR/ST  I/O = 1.3/3.7  No vte ppx  DAPT  PPI Bid  Lasix BID     Update: Tolerating SBT well, extubated and tolerating low flow oxygen.  Hoarse voice, not ready for swallow evaluation.  Will place core track for ongoing medications including DAPT.    Titrated off vasopressors after extubation.    Review of Systems  Review of Systems    Unable to perform ROS: Intubated        Vital Signs for last 24 hours   Temp:  [35.9 °C (96.6 °F)-38.5 °C (101.3 °F)] 38.5 °C (101.3 °F)  Pulse:  [] 119  Resp:  [11-26] 17  BP: ()/(46-68) 110/54  SpO2:  [95 %-98 %] 96 %    Hemodynamic parameters for last 24 hours  CVP:  [256 MM HG] 256 MM HG    Respiratory Information for the last 24 hours  Vent Mode: Spont  Rate (breaths/min): 20  Vt Target (mL): 440  PEEP/CPAP: 8  P Support: 5  MAP: 10  Length of Weaning Trial (Hours): 1  Control VTE (exp VT): 414    Physical Exam   Physical Exam  Vitals and nursing note reviewed.   Constitutional:       Comments: Sedated, full ventilator support   HENT:      Head: Normocephalic.      Nose: Nose normal.      Mouth/Throat:      Mouth: Mucous membranes are moist.      Comments: Blood in mouth  Eyes:      Pupils: Pupils are equal, round, and reactive to light.   Cardiovascular:      Rate and Rhythm: Normal rate and regular rhythm.   Pulmonary:      Comments: Intubated, full ventilator support, mildly diminished breath sounds, no wheeze, some bloody secretions from ETT  Abdominal:      General: There is no distension.      Palpations: Abdomen is soft.   Musculoskeletal:         General: No swelling or deformity. Normal range of motion.      Cervical back: Neck supple.   Skin:     General: Skin is dry.      Capillary Refill: Capillary refill takes 2 to 3 seconds.   Neurological:      Comments: Sedation changed to dexmedetomidine, patient now following commands and moving all extremities         Medications  Current Facility-Administered Medications   Medication Dose Route Frequency Provider Last Rate Last Admin   • potassium chloride (KCL) ivpb 10 mEq  10 mEq Intravenous Once Primitivo White M.D.       • sodium phosphate 15 mmol in dextrose 5% 500 mL ivpb  15 mmol Intravenous Once Primitivo White M.D. 83.3 mL/hr at 04/08/21 0858 15 mmol at 04/08/21 0858   • clopidogrel (PLAVIX) tablet 75 mg  75 mg Enteral Tube DAILY Vladimir  NA Basurto M.D.   75 mg at 04/08/21 0524   • Pharmacy Consult: Enteral tube insertion - review meds/change route/product selection  1 Each Other PHARMACY TO DOSE Primitivo White M.D.       • dexmedetomidine (PRECEDEX) 400 mcg/100mL NS premix infusion  0-1.5 mcg/kg/hr Intravenous Continuous Primitivo White M.D. 9.6 mL/hr at 04/08/21 0600 0.5 mcg/kg/hr at 04/08/21 0600   • fentaNYL (SUBLIMAZE) injection 50 mcg  50 mcg Intravenous Q HOUR PRN Primitivo White M.D.        Or   • fentaNYL (SUBLIMAZE) injection 100 mcg  100 mcg Intravenous Q HOUR PRN Primitivo White M.D.       • potassium bicarbonate (KLYTE) effervescent tablet 25 mEq  25 mEq Enteral Tube DAILY Primitivo White M.D.   25 mEq at 04/08/21 0524   • furosemide (LASIX) injection 20 mg  20 mg Intravenous BID DIURETIC Primitivo White M.D.   20 mg at 04/08/21 0524   • HYDROmorphone (Dilaudid) injection 0.5-2 mg  0.5-2 mg Intravenous Q HOUR PRN Natanael Montejo M.D.   1 mg at 04/06/21 1022   • Respiratory Therapy Consult   Nebulization Continuous RT Natanael Montejo M.D.       • senna-docusate (PERICOLACE or SENOKOT S) 8.6-50 MG per tablet 2 tablet  2 tablet Enteral Tube BID Natanael Montejo M.D.   2 tablet at 04/08/21 0524    And   • polyethylene glycol/lytes (MIRALAX) PACKET 1 Packet  1 Packet Enteral Tube QDAY PRN Natanael Montejo M.D.        And   • magnesium hydroxide (MILK OF MAGNESIA) suspension 30 mL  30 mL Enteral Tube QDAY PRN Natanael Montejo M.D.        And   • bisacodyl (DULCOLAX) suppository 10 mg  10 mg Rectal QDAY PRN Natanael Montejo M.D.       • MD Alert...ICU Electrolyte Replacement per Pharmacy   Other PHARMACY TO DOSE Natanael Montejo M.D.       • lidocaine (XYLOCAINE) 1 % injection 1-2 mL  1-2 mL Tracheal Tube Q30 MIN PRN Natanael Montejo M.D.       • ipratropium-albuterol (DUONEB) nebulizer solution  3 mL Nebulization Q2HRS PRN (RT) Natanael Montejo M.D.       • norepinephrine (Levophed)  infusion 8 mg/250 mL (premix)  0-30 mcg/min Intravenous Continuous Natanael Montejo M.D. 7.5 mL/hr at 04/08/21 0600 4 mcg/min at 04/08/21 0600   • acetaminophen (Tylenol) tablet 650 mg  650 mg Enteral Tube Q6HRS PRN Dejan Peres D.O.       • ondansetron (ZOFRAN) syringe/vial injection 4 mg  4 mg Intravenous Q4HRS PRN Dejan Peres D.O.       • ondansetron (ZOFRAN ODT) dispertab 4 mg  4 mg Enteral Tube Q4HRS PRN Dejan Peres D.O.       • insulin regular (HumuLIN R,NovoLIN R) injection  1-6 Units Subcutaneous Q6HRS Natanael Montejo M.D.   Stopped at 04/06/21 1200    And   • dextrose 50% (D50W) injection 50 mL  50 mL Intravenous Q15 MIN PRN Natanael Montejo M.D.       • pantoprazole (PROTONIX) injection 40 mg  40 mg Intravenous BID Dejan Peres D.O.   40 mg at 04/08/21 0539   • atorvastatin (LIPITOR) tablet 40 mg  40 mg Enteral Tube Q EVENING Dejan Peres D.O.   40 mg at 04/07/21 1809   • K+ Scale: Goal of 4.5  1 Each Intravenous Q6HRS Natanael Montejo M.D.   1 Each at 04/08/21 0600   • aspirin (ASA) chewable tab 81 mg  81 mg Enteral Tube DAILY Raffaele Rosen M.D.   81 mg at 04/08/21 0524       Fluids    Intake/Output Summary (Last 24 hours) at 4/8/2021 0929  Last data filed at 4/8/2021 0600  Gross per 24 hour   Intake 1353.84 ml   Output 3450 ml   Net -2096.16 ml       Laboratory  Recent Labs     04/06/21  1202 04/06/21  1202 04/06/21  1417 04/06/21  1545 04/07/21  0304   ISTATAPH 7.472   < > 7.499 7.342* 7.362*   ISTATAPCO2 28.8   < > 27.4 39.2* 35.7   ISTATAPO2 140*   < > 98* 89* 74   ISTATATCO2 22   < > 22 22 21   DCLUBQK2UIQ 99   < > 98 96 94   ISTATARTHCO3 21.1   < > 21.3 21.2 20.2   ISTATARTBE -3   < > -2 -4 -5*   ISTATTEMP 34.9 C   < > 35.5 C see below 37.2 C   ISTATFIO2 60  --  30  --  30   ISTATSPEC Arterial   < > Arterial Arterial Arterial   ISTATAPHTC 7.504*  --  7.522*  --  7.359*   KIMGSBDY5EG 127*  --  89*  --  75    < > =  values in this interval not displayed.         Recent Labs     04/06/21  0902 04/06/21  1543 04/06/21  2304 04/06/21  2304 04/07/21 0428 04/07/21 0428 04/07/21  1430 04/07/21  2345 04/08/21  0525   SODIUM  --    < > 138  --  139  --   --   --  143   POTASSIUM  --    < > 4.3   < > 4.3   < > 3.8 4.0 4.1   CHLORIDE  --    < > 109  --  111  --   --   --  111   CO2  --    < > 20  --  20  --   --   --  23   BUN  --    < > 12  --  12  --   --   --  16   CREATININE  --    < > 1.06  --  1.12  --   --   --  1.14   MAGNESIUM 2.1  --   --   --  1.7  --   --   --  2.1   PHOSPHORUS  --   --   --   --  2.6  --   --   --  2.1*   CALCIUM  --    < > 8.1*  --  8.1*  --   --   --  8.4*    < > = values in this interval not displayed.     Recent Labs     04/06/21 0759 04/06/21 0759 04/06/21 1543 04/06/21 1543 04/06/21 2304 04/07/21 0428 04/08/21  0150 04/08/21  0525   ALTSGPT 27  --  40  --   --   --   --   --    ASTSGOT 25  --  103*  --   --   --   --   --    ALKPHOSPHAT 92  --  72  --   --   --   --   --    TBILIRUBIN 0.3  --  0.6  --   --   --   --   --    PREALBUMIN  --   --   --   --   --   --  16.5*  --    GLUCOSE 224*   < > 124*   < > 99 108*  --  135*    < > = values in this interval not displayed.     Recent Labs     04/06/21 0759 04/06/21 0759 04/06/21  1543 04/06/21  2304 04/07/21  0428 04/08/21  0525   WBC 7.6   < >  --  9.0 11.3* 13.6*   NEUTSPOLYS 30.00*  --   --   --  85.60* 83.20*   LYMPHOCYTES 59.00*  --   --   --  6.10* 8.00*   MONOCYTES 2.00  --   --   --  7.10 6.90   EOSINOPHILS 3.00  --   --   --  0.40 1.20   BASOPHILS 0.00  --   --   --  0.20 0.30   ASTSGOT 25  --  103*  --   --   --    ALTSGPT 27  --  40  --   --   --    ALKPHOSPHAT 92  --  72  --   --   --    TBILIRUBIN 0.3  --  0.6  --   --   --     < > = values in this interval not displayed.     Recent Labs     04/06/21  0810 04/06/21  1720 04/06/21  2304 04/06/21  2304 04/07/21  0428 04/07/21  0428 04/07/21  1430 04/08/21  0150 04/08/21  0525   RBC   --   --  3.92*  --  3.87*  --   --   --  3.98*   HEMOGLOBIN  --    < > 12.0*   < > 11.9*   < > 12.2* 12.3* 12.1*   HEMATOCRIT  --   --  35.6*  --  35.8*  --   --   --  36.0*   PLATELETCT  --   --  166  --  154*  --   --   --  154*   PROTHROMBTM 17.1*  --   --   --   --   --   --   --   --    APTT 37.5*  --   --   --   --   --   --   --   --    INR 1.35*  --   --   --   --   --   --   --   --     < > = values in this interval not displayed.       Imaging  X-Ray:  I have personally reviewed the images and compared with prior images.    Assessment/Plan  Acute respiratory failure with hypoxia (HCC)  Assessment & Plan  Intubated 4/6  I have initiated all the appropriate ventilator bundles  Full vent support    Cardiac arrest (HCC)  Assessment & Plan  Ventricular fibrillation cardiac arrest in ED  ROSC with CPR, epinephrine, amiodarone, calcium, bicarbonate solution  Empirically given 100 mg of IV alteplase during resuscitation for presumed acute pulmonary embolism  Purposeful movements following ROSC - not a candidate for therapeutic hypothermia  ECG with no evidence of acute STEMI  Start amiodarone drip  Stat echocardiogram  Optimize potassium and magnesium  Cardiology consultation    Hyperglycemia  Assessment & Plan  Check glycohemoglobin  Sliding scale insulin    Upper gastrointestinal bleed  Assessment & Plan  Bloody NG aspirate - query related to alteplase  IV PPI twice daily  Trend hemoglobin    AF (atrial fibrillation) ([facility])  Assessment & Plan  Paroxysmal atrial fibrillation in ED  Check thyroid function  Echocardiogram  Optimize potassium and magnesium    Hypokalemia  Assessment & Plan  Replete potassium     Update:   Successfully extubated today, monitor airway closely and respiratory status  SLP swallow evaluation tomorrow.  Core track placed today and continue meds via enteral access.  Titrated off vasopressors  Diuresis as tolerated  Continue DAPT      VTE:  Contraindicated  Ulcer: PPI  Lines: Central Line   Ongoing indication addressed    I have performed a physical exam and reviewed and updated ROS and Plan today (4/8/2021). In review of yesterday's note (4/7/2021), there are no changes except as documented above.     Discussed patient condition and risk of morbidity and/or mortality with RN, RT, Pharmacy and QA team  The patient remains critically ill.  Critical care time = 33 minutes in directly providing and coordinating critical care and extensive data review.  No time overlap and excludes procedures.

## 2021-04-08 NOTE — CARE PLAN
Problem: Respiratory:  Goal: Respiratory status will improve  Outcome: PROGRESSING AS EXPECTED  Intervention: Assess and monitor pulmonary status  Flowsheets  Taken 4/8/2021 1415 by Kayla Shelby RMANDEEP  Work Of Breathing / Effort: Mild  Taken 4/8/2021 1345 by Kayla Shelby R.N.  Resp: 14  SpO2: 97 %  Taken 4/8/2021 1200 by Kayla Shelby R.N.  Cough: Productive  RUL Breath Sounds: Diminished  RML Breath Sounds: Diminished  RLL Breath Sounds: Diminished  SHAQ Breath Sounds: Diminished  LLL Breath Sounds: Diminished  Taken 4/8/2021 1035 by Yolanda Whitlock, RRT  O2 (LPM): 3  Note: Extubated this shift     Problem: Safety - Medical Restraint  Goal: Remains free of injury from restraints (Restraint for Interference with Medical Device)  Description: INTERVENTIONS:  1. Determine that other, less restrictive measures have been tried or would not be effective before applying the restraint  2. Evaluate the patient's condition at the time of restraint application  3. Inform patient/family regarding the reason for restraint  4. Q2H: Monitor safety, psychosocial status, comfort, nutrition and hydration  Outcome: MET  Flowsheets (Taken 4/8/2021 1415)  Addressed this shift: Remains free of injury from restraints (restraint for interference with medical device):   Determine that other, less restrictive measures have been tried or would not be effective before applying the restraint   Evaluate the patient's condition at the time of restraint application   Inform patient/family regarding the reason for restraint   Every 2 hours: Monitor safety, psychosocial status, comfort, nutrition and hydration  Note: Restraints removed with extubation   Goal: Free from restraint(s) (Restraint for Interference with Medical Device)  Description: INTERVENTIONS:  1. ONCE/SHIFT or MINIMUM Q12H: Assess and document the continuing need for restraints  2. Q24H: Continued use of restraint requires LIP to perform face to face examination and  written order  3. Identify and implement measures to help patient regain control  Outcome: MET  Flowsheets (Taken 4/8/2021 4361)  Addressed this shift: Free from restraint(s) (restraint for interference with medical device):   ONCE/SHIFT or MINIMUM Every 12 hours: Assess and document the continuing need for restraints   Every 24 hours: Continued use of restraint requires Licensed Independent Practitioner to perform face to face examination and written order   Identify and implement measures to help patient regain control  Note: Restraints removed with extubation

## 2021-04-08 NOTE — PROGRESS NOTES
Cardiology Follow Up Progress Note    Date of Service  4/8/2021    Attending Physician  Primitivo White M.D.    Reason for consult  VF arrest    HPI  Ian Condon is a 76 y.o. male admitted with chest pain.  Patient had VF arrest in the ER and was given alteplase for presumed pulmonary embolism.  Cardiology consulted for further evaluation. PCI to the proximal and mid LAD and diagonal.    Interim Events  Patient continues to be intubated.    Review of Systems  Review of Systems   Unable to perform ROS: Intubated     Vital signs in last 24 hours  Temp:  [35.9 °C (96.6 °F)-38.5 °C (101.3 °F)] 37.8 °C (100 °F)  Pulse:  [] 82  Resp:  [8-26] 14  BP: ()/(45-68) 84/45  SpO2:  [95 %-98 %] 97 %    Physical Exam  Physical Exam   Constitutional:   Patient intubated.   HENT:   Head: Normocephalic and atraumatic.   Neck: No JVD present.   Cardiovascular: Normal rate, regular rhythm and intact distal pulses. Exam reveals no gallop and no friction rub.   No murmur heard.  Pulmonary/Chest: He has no wheezes. He has no rales.   Abdominal: Soft. He exhibits no distension.   Musculoskeletal:         General: No edema.      Cervical back: Neck supple.   Neurological:   Patient intubated   Skin: Skin is warm and dry.       Lab Review  Recent Labs     04/06/21 2304 04/06/21 2304 04/07/21 0428 04/07/21  1430 04/08/21  0150 04/08/21  0525 04/08/21  1024   WBC 9.0  --  11.3*  --   --  13.6*  --    RBC 3.92*  --  3.87*  --   --  3.98*  --    HEMOGLOBIN 12.0*   < > 11.9*   < > 12.3* 12.1* 12.1*   HEMATOCRIT 35.6*  --  35.8*  --   --  36.0*  --    PLATELETCT 166  --  154*  --   --  154*  --     < > = values in this interval not displayed.     Recent Labs     04/06/21 2304 04/06/21  2304 04/07/21  0428 04/07/21  1430 04/07/21  2345 04/08/21  0525 04/08/21  1327   SODIUM 138  --  139  --   --  143  --    POTASSIUM 4.3   < > 4.3   < > 4.0 4.1 3.5*   CHLORIDE 109  --  111  --   --  111  --    CO2 20  --  20  --   --  23  --     GLUCOSE 99  --  108*  --   --  135*  --    BUN 12  --  12  --   --  16  --    CREATININE 1.06  --  1.12  --   --  1.14  --     < > = values in this interval not displayed.      Recent Labs     04/06/21  0759 04/06/21  0759 04/06/21  1543 04/06/21  1543 04/06/21  2304 04/07/21  0428 04/08/21  0150 04/08/21  0525   ALTSGPT 27  --  40  --   --   --   --   --    ASTSGOT 25  --  103*  --   --   --   --   --    ALKPHOSPHAT 92  --  72  --   --   --   --   --    TBILIRUBIN 0.3  --  0.6  --   --   --   --   --    PREALBUMIN  --   --   --   --   --   --  16.5*  --    GLUCOSE 224*   < > 124*   < > 99 108*  --  135*    < > = values in this interval not displayed.      Recent Labs     04/06/21  0810   APTT 37.5*   INR 1.35*      Lab Results   Component Value Date/Time    CHOLSTRLTOT 130 04/06/2021 11:04 PM    LDL 75 04/06/2021 11:04 PM    HDL 41 04/06/2021 11:04 PM    TRIGLYCERIDE 72 04/08/2021 01:50 AM    TROPONINT 938 (H) 04/06/2021 11:58 AM         Labs reviewed as noted above.  Normal renal function.    Cardiac Imaging and Procedures Review  Telemetry reviewed and showed no recurrent ventricular arrhythmias.    Assessment/Plan    VF arrest:  Coronary artery disease:  Ischemic cardiomyopathy:  Respiratory failure:  Atrial fibrillation:  GI bleed:    Patient VF arrest was likely secondary to ischemia based on coronary angiogram results.  But as discussed previously, etiology is difficult to determine because the patient received alteplase for presumed PE.  He is status post PCI to the LAD and diagonal.  Will need evaluation for residual disease in the circumflex at some point during this admission.  Continue aspirin and Plavix.  Continue Lipitor.    No recurrent VF.  Continue to hold off on antiarrhythmics.  He continues to be on vasopressors.  Hold off on beta-blockers and ACE inhibitor/ARB for now.  Continue Lasix at current dose.    As discussed previously, his atrial fibrillation occurred after his VF arrest, possibly  secondary to ischemia.  No recurrent A. fib.  Would recommend outpatient ambulatory EKG monitoring to evaluate for occult atrial fibrillation.  With his mild GI bleed that occurred after alteplase would not recommend triple therapy at this time given increased risk for bleeding.    Will follow    Thank you for allowing me to participate in the care of this patient. Please do not hesitate to contact me with any questions.    I have performed a physical exam and reviewed and updated ROS as of today, 4/8/2021.  In review of yesterday's note dated, 4/7/2021, there are no changes except as documented above.    Ching Macias MD, [facility]  Cardiologist  [facility]

## 2021-04-08 NOTE — PROGRESS NOTES
Cardiology Follow Up Progress Note    Date of Service  4/7/2021    Attending Physician  Primitivo White M.D.    Reason for consult  VF arrest    HPI  Ian Condon is a 76 y.o. male admitted with chest pain.  Patient had VF arrest in the ER and was given alteplase for presumed pulmonary embolism.  Cardiology consulted for further evaluation.    Interim Events  Patient referred for coronary angiogram yesterday and underwent PCI to the proximal and mid LAD and diagonal.  Patient continues to be intubated.    Review of Systems  Review of Systems   Unable to perform ROS: Intubated       Vital signs in last 24 hours  Temp:  [36.1 °C (97 °F)-37.2 °C (99 °F)] 37.2 °C (99 °F)  Pulse:  [67-95] 92  Resp:  [13-23] 21  BP: ()/(47-81) 103/57  SpO2:  [94 %-100 %] 96 %    Physical Exam  Physical Exam   Constitutional:   Patient intubated.   HENT:   Head: Normocephalic and atraumatic.   Neck: No JVD present.   Cardiovascular: Normal rate, regular rhythm and intact distal pulses. Exam reveals no gallop and no friction rub.   No murmur heard.  Pulmonary/Chest: He has no wheezes. He has no rales.   Abdominal: Soft. He exhibits no distension.   Musculoskeletal:         General: No edema.      Cervical back: Neck supple.   Neurological:   Patient intubated   Skin: Skin is warm and dry.       Lab Review  Recent Labs     04/06/21  0759 04/06/21  1720 04/06/21  2304 04/07/21  0428 04/07/21  1430   WBC 7.6  --  9.0 11.3*  --    RBC 4.69*  --  3.92* 3.87*  --    HEMOGLOBIN 14.5   < > 12.0* 11.9* 12.2*   HEMATOCRIT 45.4  --  35.6* 35.8*  --    PLATELETCT 177  --  166 154*  --     < > = values in this interval not displayed.     Recent Labs     04/06/21  1543 04/06/21  1720 04/06/21  2304 04/07/21  0428 04/07/21  1430   SODIUM 140  --  138 139  --    POTASSIUM 4.4   < > 4.3 4.3 3.8   CHLORIDE 111  --  109 111  --    CO2 23  --  20 20  --    GLUCOSE 124*  --  99 108*  --    BUN 16  --  12 12  --    CREATININE 1.09  --  1.06 1.12  --      < > = values in this interval not displayed.      Recent Labs     04/06/21  0759 04/06/21  0759 04/06/21  1543 04/06/21  2304 04/07/21  0428   ALTSGPT 27  --  40  --   --    ASTSGOT 25  --  103*  --   --    ALKPHOSPHAT 92  --  72  --   --    TBILIRUBIN 0.3  --  0.6  --   --    GLUCOSE 224*   < > 124* 99 108*    < > = values in this interval not displayed.      Recent Labs     04/06/21  0810   APTT 37.5*   INR 1.35*      Lab Results   Component Value Date/Time    CHOLSTRLTOT 130 04/06/2021 11:04 PM    LDL 75 04/06/2021 11:04 PM    HDL 41 04/06/2021 11:04 PM    TRIGLYCERIDE 71 04/06/2021 11:04 PM    TROPONINT 938 (H) 04/06/2021 11:58 AM         Labs reviewed as noted above.  Normal renal function.    Cardiac Imaging and Procedures Review  EKG performed yesterday at 5:10 PM was personally reviewed and per my interpretation shows sinus rhythm with anterior Q waves.  ST changes have improved.    Telemetry reviewed and showed no recurrent ventricular arrhythmias.    Assessment/Plan    VF arrest:  Coronary artery disease:  Ischemic cardiomyopathy:  Respiratory failure:  Atrial fibrillation:  GI bleed:    Patient had VF on presentation likely secondary to ischemia based on coronary angiogram results.  However etiology is difficult to determine because the patient received alteplase for presumed pulmonary embolism.  He is now status post PCI to the LAD and diagonal.  He has residual disease in the circumflex which will be reevaluated once the patient is extubated depending on his symptoms.  He may need stress testing to evaluate this further as well.  Continue aspirin and Plavix.  Continue Lipitor.    He has not had any recurrent ventricular arrhythmias.  Since his VF is considered to be ischemic we will discontinue amiodarone for now.  Should the patient have any recurrent/sustained ventricular arrhythmias will need to consider EP evaluation.    Continue Lasix per primary team.  He does not have adequate blood pressure  room for initiation of any cardioprotective this time.  He continues to be on low-dose Levophed.    He had atrial fibrillation after VF arrest, likely secondary to ischemia as well.  He has not had any recurrent arrhythmias.  Patient had a minor GI bleed after receiving alteplase.  Since his atrial fibrillation is likely secondary to ischemia and he has no known cardiac history and he is on dual antiplatelet therapy, at this time the risks of bleeding with triple therapy probably outweigh the benefits of anticoagulation with one episode of atrial fibrillation.  However would recommend that patient be monitored with a 1 month ambulatory EKG monitor after dischargfe to evaluate for occult atrial fibrillation.    Will follow    Thank you for allowing me to participate in the care of this patient. Please do not hesitate to contact me with any questions.    I have performed a physical exam and reviewed and updated ROS as of today, 4/7/2021.  In review of yesterday's note dated, 4/6/2021, there are no changes except as documented above.    Ching Macias MD, [facility]  Cardiologist  [facility]

## 2021-04-08 NOTE — FLOWSHEET NOTE
Extubation    Cuff leak noted Yes    Stridor present NO    O2 (LPM): 3      Patient toleration  PT tolerated well    RCP Complete? Not yet    Events/Summary/Plan: PT extubated

## 2021-04-09 PROBLEM — I21.4 NSTEMI (NON-ST ELEVATED MYOCARDIAL INFARCTION) (HCC): Status: ACTIVE | Noted: 2021-04-09

## 2021-04-09 LAB
ANION GAP SERPL CALC-SCNC: 4 MMOL/L (ref 7–16)
BASOPHILS # BLD AUTO: 0.3 % (ref 0–1.8)
BASOPHILS # BLD: 0.03 K/UL (ref 0–0.12)
BUN SERPL-MCNC: 30 MG/DL (ref 8–22)
CALCIUM SERPL-MCNC: 8.2 MG/DL (ref 8.5–10.5)
CHLORIDE SERPL-SCNC: 107 MMOL/L (ref 96–112)
CO2 SERPL-SCNC: 26 MMOL/L (ref 20–33)
CREAT SERPL-MCNC: 1.35 MG/DL (ref 0.5–1.4)
EOSINOPHIL # BLD AUTO: 0.47 K/UL (ref 0–0.51)
EOSINOPHIL NFR BLD: 4.4 % (ref 0–6.9)
ERYTHROCYTE [DISTWIDTH] IN BLOOD BY AUTOMATED COUNT: 48.4 FL (ref 35.9–50)
GLUCOSE BLD-MCNC: 124 MG/DL (ref 65–99)
GLUCOSE BLD-MCNC: 129 MG/DL (ref 65–99)
GLUCOSE SERPL-MCNC: 122 MG/DL (ref 65–99)
HCT VFR BLD AUTO: 32.5 % (ref 42–52)
HGB BLD-MCNC: 10.9 G/DL (ref 14–18)
HGB BLD-MCNC: 11.1 G/DL (ref 14–18)
IMM GRANULOCYTES # BLD AUTO: 0.06 K/UL (ref 0–0.11)
IMM GRANULOCYTES NFR BLD AUTO: 0.6 % (ref 0–0.9)
LYMPHOCYTES # BLD AUTO: 0.91 K/UL (ref 1–4.8)
LYMPHOCYTES NFR BLD: 8.5 % (ref 22–41)
MAGNESIUM SERPL-MCNC: 2 MG/DL (ref 1.5–2.5)
MCH RBC QN AUTO: 30.7 PG (ref 27–33)
MCHC RBC AUTO-ENTMCNC: 33.5 G/DL (ref 33.7–35.3)
MCV RBC AUTO: 91.5 FL (ref 81.4–97.8)
MONOCYTES # BLD AUTO: 0.97 K/UL (ref 0–0.85)
MONOCYTES NFR BLD AUTO: 9.1 % (ref 0–13.4)
NEUTROPHILS # BLD AUTO: 8.21 K/UL (ref 1.82–7.42)
NEUTROPHILS NFR BLD: 77.1 % (ref 44–72)
NRBC # BLD AUTO: 0 K/UL
NRBC BLD-RTO: 0 /100 WBC
PHOSPHATE SERPL-MCNC: 2.5 MG/DL (ref 2.5–4.5)
PLATELET # BLD AUTO: 144 K/UL (ref 164–446)
PMV BLD AUTO: 12.3 FL (ref 9–12.9)
POTASSIUM SERPL-SCNC: 4 MMOL/L (ref 3.6–5.5)
POTASSIUM SERPL-SCNC: 4.1 MMOL/L (ref 3.6–5.5)
RBC # BLD AUTO: 3.55 M/UL (ref 4.7–6.1)
SODIUM SERPL-SCNC: 137 MMOL/L (ref 135–145)
WBC # BLD AUTO: 10.7 K/UL (ref 4.8–10.8)

## 2021-04-09 PROCEDURE — 700102 HCHG RX REV CODE 250 W/ 637 OVERRIDE(OP): Performed by: INTERNAL MEDICINE

## 2021-04-09 PROCEDURE — A9270 NON-COVERED ITEM OR SERVICE: HCPCS | Performed by: INTERNAL MEDICINE

## 2021-04-09 PROCEDURE — 700111 HCHG RX REV CODE 636 W/ 250 OVERRIDE (IP): Performed by: INTERNAL MEDICINE

## 2021-04-09 PROCEDURE — 99233 SBSQ HOSP IP/OBS HIGH 50: CPT | Performed by: HOSPITALIST

## 2021-04-09 PROCEDURE — 84132 ASSAY OF SERUM POTASSIUM: CPT

## 2021-04-09 PROCEDURE — 99233 SBSQ HOSP IP/OBS HIGH 50: CPT | Performed by: INTERNAL MEDICINE

## 2021-04-09 PROCEDURE — 700102 HCHG RX REV CODE 250 W/ 637 OVERRIDE(OP): Performed by: HOSPITALIST

## 2021-04-09 PROCEDURE — 84100 ASSAY OF PHOSPHORUS: CPT

## 2021-04-09 PROCEDURE — 700111 HCHG RX REV CODE 636 W/ 250 OVERRIDE (IP): Performed by: HOSPITALIST

## 2021-04-09 PROCEDURE — 700101 HCHG RX REV CODE 250: Performed by: INTERNAL MEDICINE

## 2021-04-09 PROCEDURE — 85018 HEMOGLOBIN: CPT

## 2021-04-09 PROCEDURE — 80048 BASIC METABOLIC PNL TOTAL CA: CPT

## 2021-04-09 PROCEDURE — 83735 ASSAY OF MAGNESIUM: CPT

## 2021-04-09 PROCEDURE — 82962 GLUCOSE BLOOD TEST: CPT | Mod: 91

## 2021-04-09 PROCEDURE — C9113 INJ PANTOPRAZOLE SODIUM, VIA: HCPCS | Performed by: HOSPITALIST

## 2021-04-09 PROCEDURE — A9270 NON-COVERED ITEM OR SERVICE: HCPCS | Performed by: HOSPITALIST

## 2021-04-09 PROCEDURE — 770020 HCHG ROOM/CARE - TELE (206)

## 2021-04-09 PROCEDURE — 85025 COMPLETE CBC W/AUTO DIFF WBC: CPT

## 2021-04-09 PROCEDURE — 94690 O2 UPTK REST INDIRECT: CPT

## 2021-04-09 RX ORDER — POTASSIUM CHLORIDE 7.45 MG/ML
10 INJECTION INTRAVENOUS ONCE
Status: COMPLETED | OUTPATIENT
Start: 2021-04-09 | End: 2021-04-09

## 2021-04-09 RX ADMIN — POTASSIUM CHLORIDE 10 MEQ: 7.46 INJECTION, SOLUTION INTRAVENOUS at 09:22

## 2021-04-09 RX ADMIN — FUROSEMIDE 20 MG: 10 INJECTION, SOLUTION INTRAMUSCULAR; INTRAVENOUS at 05:37

## 2021-04-09 RX ADMIN — PANTOPRAZOLE SODIUM 40 MG: 40 INJECTION, POWDER, FOR SOLUTION INTRAVENOUS at 05:37

## 2021-04-09 RX ADMIN — POTASSIUM CHLORIDE 10 MEQ: 7.46 INJECTION, SOLUTION INTRAVENOUS at 00:34

## 2021-04-09 RX ADMIN — HYDROMORPHONE HYDROCHLORIDE 1 MG: 1 INJECTION, SOLUTION INTRAMUSCULAR; INTRAVENOUS; SUBCUTANEOUS at 16:37

## 2021-04-09 RX ADMIN — DOCUSATE SODIUM 50 MG AND SENNOSIDES 8.6 MG 2 TABLET: 8.6; 5 TABLET, FILM COATED ORAL at 05:37

## 2021-04-09 RX ADMIN — LIDOCAINE 1 PATCH: 50 PATCH TOPICAL at 13:13

## 2021-04-09 RX ADMIN — OMEPRAZOLE 20 MG: KIT at 18:54

## 2021-04-09 RX ADMIN — ACETAMINOPHEN 650 MG: 325 TABLET, FILM COATED ORAL at 18:54

## 2021-04-09 RX ADMIN — CLOPIDOGREL BISULFATE 75 MG: 75 TABLET ORAL at 05:37

## 2021-04-09 RX ADMIN — DOCUSATE SODIUM 50 MG AND SENNOSIDES 8.6 MG 2 TABLET: 8.6; 5 TABLET, FILM COATED ORAL at 18:54

## 2021-04-09 RX ADMIN — HYDROMORPHONE HYDROCHLORIDE 1 MG: 1 INJECTION, SOLUTION INTRAMUSCULAR; INTRAVENOUS; SUBCUTANEOUS at 09:26

## 2021-04-09 RX ADMIN — METOPROLOL TARTRATE 12.5 MG: 25 TABLET, FILM COATED ORAL at 15:28

## 2021-04-09 RX ADMIN — ASPIRIN 81 MG: 81 TABLET, CHEWABLE ORAL at 05:37

## 2021-04-09 RX ADMIN — ATORVASTATIN CALCIUM 40 MG: 40 TABLET, FILM COATED ORAL at 18:54

## 2021-04-09 RX ADMIN — FUROSEMIDE 20 MG: 10 INJECTION, SOLUTION INTRAMUSCULAR; INTRAVENOUS at 15:28

## 2021-04-09 RX ADMIN — POTASSIUM BICARBONATE 25 MEQ: 978 TABLET, EFFERVESCENT ORAL at 05:37

## 2021-04-09 ASSESSMENT — ENCOUNTER SYMPTOMS
SORE THROAT: 1
BLURRED VISION: 0
COUGH: 0
NAUSEA: 0
SHORTNESS OF BREATH: 0
PALPITATIONS: 0
HEADACHES: 0
LOSS OF CONSCIOUSNESS: 0
DOUBLE VISION: 0
DIARRHEA: 0
DIZZINESS: 0
NERVOUS/ANXIOUS: 1
CHILLS: 0
BACK PAIN: 0
FEVER: 0
SORE THROAT: 0
ABDOMINAL PAIN: 0
VOMITING: 0

## 2021-04-09 ASSESSMENT — PAIN DESCRIPTION - PAIN TYPE
TYPE: ACUTE PAIN

## 2021-04-09 ASSESSMENT — FIBROSIS 4 INDEX: FIB4 SCORE: 8.71

## 2021-04-09 NOTE — PROGRESS NOTES
Critical Care Progress Note    Date of admission  4/6/2021    Chief Complaint  VT/VF cardiac arrest    Hospital Course  76 y.o. male, unknown PMH, presented 4/6/2021 to the emergency department via EMS with chest pain and shortness of breath.  He was awake and alert when he was initially admitted to the ED.  Apparently he has had chest pain and shortness of breath off and on for the last 3 days.  Shortly after being admitted to the ED, he complained of sharp chest pain and became unresponsive.  He was briefly in ventricular tachycardia and then had ventricular fibrillation.  Emergent resuscitation was initiated with CPR, IV epinephrine, IV amiodarone, IV calcium and IV bicarbonate solution.  He had mottling from the mid chest superiorly and there was a concern that he may have an acute massive pulmonary embolism.  He was empirically administered 50 mg of IV alteplase.  ROSC was achieved after approximately 18 minutes.  A second 50 mg dose of IV alteplase was administered per protocol.  After ROSC, he was in atrial fibrillation.  He ultimately developed purposeful movements as he was trying to reach up for his endotracheal tube with his hands and trying to sit up.  He was subsequently sedated for his comfort.  An orogastric tube was placed and bright red blood was initially aspirated from his stomach.  His intubation was atraumatic and no blood was noticed in his oropharynx at the time of intubation    4/9 -extubated on 4/8, failed bedside swallow, reassessed today, off pressors, A/O x4, on Lasix.  Okay to move out of ICU    Interval Problem Update  Reviewed last 24 hour events:  Extubated yesterday, 3 LPM oxygen  No pressors  WBC 10.7  Hemoglobin 11.1-10.9  A/o x 4  Afebrile  Swallow eval today, remove   3 lpm n/c  DAPT  PPI  Lasix BID  Stop K-scale    Review of Systems  Review of Systems   Constitutional: Negative for chills and fever.   HENT: Positive for sore throat.    Cardiovascular: Positive for chest pain.         Vital Signs for last 24 hours   Temp:  [36.3 °C (97.4 °F)-38.5 °C (101.3 °F)] 36.6 °C (97.8 °F)  Pulse:  [] 96  Resp:  [8-26] 19  BP: ()/(45-67) 127/63  SpO2:  [83 %-100 %] 93 %    Hemodynamic parameters for last 24 hours       Respiratory Information for the last 24 hours  Vent Mode: ASV  Length of Weaning Trial (Hours): 3    Physical Exam   Physical Exam  Vitals and nursing note reviewed.   Constitutional:       Appearance: Normal appearance.   HENT:      Head: Normocephalic.      Nose: Nose normal.      Mouth/Throat:      Mouth: Mucous membranes are moist.      Comments: Mildly hoarse voice, no stridor  Eyes:      Pupils: Pupils are equal, round, and reactive to light.   Cardiovascular:      Rate and Rhythm: Normal rate and regular rhythm.   Pulmonary:      Effort: Pulmonary effort is normal. No respiratory distress.      Breath sounds: No wheezing.   Abdominal:      General: There is no distension.      Palpations: Abdomen is soft.   Musculoskeletal:         General: No swelling or deformity. Normal range of motion.      Cervical back: Neck supple. No rigidity.   Skin:     General: Skin is dry.      Capillary Refill: Capillary refill takes 2 to 3 seconds.   Neurological:      General: No focal deficit present.      Mental Status: He is alert and oriented to person, place, and time.      Cranial Nerves: No cranial nerve deficit.         Medications  Current Facility-Administered Medications   Medication Dose Route Frequency Provider Last Rate Last Admin   • HYDROmorphone (Dilaudid) injection 0.5-1 mg  0.5-1 mg Intravenous Q2HRS PRN Primitivo White M.D.   1 mg at 04/08/21 1744   • lidocaine (LIDODERM) 5 % 1 Patch  1 Patch Transdermal Q24HR Primitivo White M.D.   1 Patch at 04/08/21 1323   • clopidogrel (PLAVIX) tablet 75 mg  75 mg Enteral Tube DAILY Vladimir Basurto M.D.   75 mg at 04/09/21 0537   • Pharmacy Consult: Enteral tube insertion - review meds/change route/product selection  1 Each Other  PHARMACY TO DOSE Primitivo White M.D.       • potassium bicarbonate (KLYTE) effervescent tablet 25 mEq  25 mEq Enteral Tube DAILY Primitivo White M.D.   25 mEq at 04/09/21 0537   • furosemide (LASIX) injection 20 mg  20 mg Intravenous BID DIURETIC Primitivo White M.D.   20 mg at 04/09/21 0537   • Respiratory Therapy Consult   Nebulization Continuous RT Natanael Montejo M.D.       • senna-docusate (PERICOLACE or SENOKOT S) 8.6-50 MG per tablet 2 tablet  2 tablet Enteral Tube BID Natanael Montejo M.D.   2 tablet at 04/09/21 0537    And   • polyethylene glycol/lytes (MIRALAX) PACKET 1 Packet  1 Packet Enteral Tube QDAY PRN Natanael Montejo M.D.        And   • magnesium hydroxide (MILK OF MAGNESIA) suspension 30 mL  30 mL Enteral Tube QDAY PRN Natanael Montejo M.D.        And   • bisacodyl (DULCOLAX) suppository 10 mg  10 mg Rectal QDAY PRN Natanael Montejo M.D.       • MD Alert...ICU Electrolyte Replacement per Pharmacy   Other PHARMACY TO DOSE Natanael Montejo M.D.       • ipratropium-albuterol (DUONEB) nebulizer solution  3 mL Nebulization Q2HRS PRN (RT) Natanael Montejo M.D.       • norepinephrine (Levophed) infusion 8 mg/250 mL (premix)  0-30 mcg/min Intravenous Continuous Natanael Montejo M.D. 5.6 mL/hr at 04/09/21 0652 3 mcg/min at 04/09/21 0652   • acetaminophen (Tylenol) tablet 650 mg  650 mg Enteral Tube Q6HRS PRN Dejan Peres, D.O.   650 mg at 04/08/21 2306   • ondansetron (ZOFRAN) syringe/vial injection 4 mg  4 mg Intravenous Q4HRS PRN Dejan Peres D.O.       • ondansetron (ZOFRAN ODT) dispertab 4 mg  4 mg Enteral Tube Q4HRS PRN Dejan Peres, D.O.       • insulin regular (HumuLIN R,NovoLIN R) injection  1-6 Units Subcutaneous Q6HRS Natanael Montejo M.D.   Stopped at 04/06/21 1200    And   • dextrose 50% (D50W) injection 50 mL  50 mL Intravenous Q15 MIN PRN Natanael Montejo M.D.       • pantoprazole (PROTONIX) injection  40 mg  40 mg Intravenous BID JADYN PuenteO.   40 mg at 04/09/21 0537   • atorvastatin (LIPITOR) tablet 40 mg  40 mg Enteral Tube Q EVENING SADE Puente.O.   40 mg at 04/08/21 1730   • K+ Scale: Goal of 4.5  1 Each Intravenous Q6HRS Natanael Montejo M.D.   1 Each at 04/09/21 0000   • aspirin (ASA) chewable tab 81 mg  81 mg Enteral Tube DAILY Raffaele Rosen M.D.   81 mg at 04/09/21 0537       Fluids    Intake/Output Summary (Last 24 hours) at 4/9/2021 0711  Last data filed at 4/9/2021 0605  Gross per 24 hour   Intake 1834.55 ml   Output 1330 ml   Net 504.55 ml       Laboratory  Recent Labs     04/06/21  1417 04/06/21  1417 04/06/21  1545 04/07/21  0304 04/08/21  0438   ISTATAPH 7.499   < > 7.342* 7.362* 7.472   ISTATAPCO2 27.4   < > 39.2* 35.7 31.0   ISTATAPO2 98*   < > 89* 74 73   ISTATATCO2 22   < > 22 21 24   VGRFITV9BWC 98   < > 96 94 96   ISTATARTHCO3 21.3   < > 21.2 20.2 22.7   ISTATARTBE -2   < > -4 -5* 0   ISTATTEMP 35.5 C   < > see below 37.2 C 38.3 C   ISTATFIO2 30  --   --  30 30   ISTATSPEC Arterial   < > Arterial Arterial Arterial   ISTATAPHTC 7.522*  --   --  7.359* 7.452   TLAGCZHV0DT 89*  --   --  75 79    < > = values in this interval not displayed.         Recent Labs     04/07/21  0428 04/07/21  1430 04/08/21  0525 04/08/21  1327 04/08/21  1745 04/09/21  0000 04/09/21  0533   SODIUM 139  --  143  --   --   --  137   POTASSIUM 4.3   < > 4.1   < > 4.0 4.0 4.1   CHLORIDE 111  --  111  --   --   --  107   CO2 20  --  23  --   --   --  26   BUN 12  --  16  --   --   --  30*   CREATININE 1.12  --  1.14  --   --   --  1.35   MAGNESIUM 1.7  --  2.1  --   --   --  2.0   PHOSPHORUS 2.6  --  2.1*  --   --   --  2.5   CALCIUM 8.1*  --  8.4*  --   --   --  8.2*    < > = values in this interval not displayed.     Recent Labs     04/06/21  0759 04/06/21  0759 04/06/21  1543 04/06/21  2304 04/07/21  0428 04/08/21  0150 04/08/21  0525 04/09/21  0533   ALTPT 27  --  40  --   --    --   --   --    ASTSGOT 25  --  103*  --   --   --   --   --    ALKPHOSPHAT 92  --  72  --   --   --   --   --    TBILIRUBIN 0.3  --  0.6  --   --   --   --   --    PREALBUMIN  --   --   --   --   --  16.5*  --   --    GLUCOSE 224*   < > 124*   < > 108*  --  135* 122*    < > = values in this interval not displayed.     Recent Labs       0000 04/06/21  0759 04/06/21  1543 04/06/21  2304 04/07/21  0428 04/08/21  0525 04/09/21  0533   WBC  --  7.6  --    < > 11.3* 13.6* 10.7   NEUTSPOLYS   < > 30.00*  --   --  85.60* 83.20* 77.10*   LYMPHOCYTES   < > 59.00*  --   --  6.10* 8.00* 8.50*   MONOCYTES   < > 2.00  --   --  7.10 6.90 9.10   EOSINOPHILS   < > 3.00  --   --  0.40 1.20 4.40   BASOPHILS   < > 0.00  --   --  0.20 0.30 0.30   ASTSGOT  --  25 103*  --   --   --   --    ALTSGPT  --  27 40  --   --   --   --    ALKPHOSPHAT  --  92 72  --   --   --   --    TBILIRUBIN  --  0.3 0.6  --   --   --   --     < > = values in this interval not displayed.     Recent Labs     04/06/21  0810 04/06/21  1720 04/07/21  0428 04/07/21  1430 04/08/21  0525 04/08/21  0525 04/08/21  1024 04/09/21  0000 04/09/21  0533   RBC  --    < > 3.87*  --  3.98*  --   --   --  3.55*   HEMOGLOBIN  --    < > 11.9*   < > 12.1*   < > 12.1* 11.1* 10.9*   HEMATOCRIT  --    < > 35.8*  --  36.0*  --   --   --  32.5*   PLATELETCT  --    < > 154*  --  154*  --   --   --  144*   PROTHROMBTM 17.1*  --   --   --   --   --   --   --   --    APTT 37.5*  --   --   --   --   --   --   --   --    INR 1.35*  --   --   --   --   --   --   --   --     < > = values in this interval not displayed.       Imaging  X-Ray:  I have personally reviewed the images and compared with prior images.    Assessment/Plan  Acute respiratory failure with hypoxia (HCC)  Assessment & Plan  Intubated 4/6  Extubated 4/8  Wean FiO2, PT/OT    Cardiac arrest ([facility])  Assessment & Plan  Ventricular fibrillation cardiac arrest in ED  ROSC with CPR, epinephrine, amiodarone, calcium, bicarbonate  solution  Received alteplase in ED for possible PE  Found to have CAD and underwent PCI to LAD and diagonal  Continue DAPT, statin, cardiology following, EF 35%, further follow-up for residual CAD, myocardial function    Hyperglycemia  Assessment & Plan  Check glycohemoglobin  Sliding scale insulin    Upper gastrointestinal bleed  Assessment & Plan  Bloody NG aspirate - query related to alteplase  PPI, H&H stable    AF (atrial fibrillation) ([facility])  Assessment & Plan  Paroxysmal atrial fibrillation in ED  SR    Hypokalemia  Assessment & Plan  Replete potassium     Update:   Liberate from mechanical ventilation, wean FiO2, increase mobility, PT OT  Some ongoing dysphagia, hoarse voice.  Formal SLP swallow eval today.  We will remove core track and advance diet once passes swallow evaluation.  In the meantime maintain enteral access for nutrition and medications.  PT/OT.  Phillip catheter in place.  Some ongoing discomfort and history of prostatitis.  Okay to transfer to telemetry.  Will sign off to hospitalist team.

## 2021-04-09 NOTE — PROGRESS NOTES
Cardiology Follow Up Progress Note    Date of Service  4/9/2021    Attending Physician  Primitivo White M.D.    Reason for consult  VF arrest    HPI  Ian Condon is a 76 y.o. male admitted with chest pain.  Patient had VF arrest in the ER and was given alteplase for presumed pulmonary embolism.  Cardiology consulted for further evaluation. PCI to the proximal and mid LAD and diagonal.    Interim Events  Patient extubated overnight.  Off of vasopressors.  Complains of pain in his ribs.    Review of Systems  Review of Systems   Constitutional: Negative for malaise/fatigue.   Respiratory: Negative for shortness of breath.    Cardiovascular: Positive for chest pain.   Gastrointestinal: Negative for abdominal pain.   Neurological: Negative for dizziness.   All other systems reviewed and are negative.    Vital signs in last 24 hours  Temp:  [36.3 °C (97.4 °F)-37.8 °C (100 °F)] 37.5 °C (99.5 °F)  Pulse:  [] 83  Resp:  [10-26] 15  BP: ()/(45-67) 107/53  SpO2:  [83 %-100 %] 93 %    Physical Exam  Physical Exam   Constitutional: He is oriented to person, place, and time. No distress.   Cardiovascular: Normal rate and regular rhythm. Exam reveals no gallop and no friction rub.   No murmur heard.  Pulmonary/Chest: Effort normal and breath sounds normal. No respiratory distress. He has no wheezes. He has no rales.   Abdominal: Soft. He exhibits no distension. There is no abdominal tenderness.   Musculoskeletal:         General: No edema.   Neurological: He is alert and oriented to person, place, and time.   Skin: Skin is warm and dry. He is not diaphoretic.   Nursing note and vitals reviewed.      Lab Review  Recent Labs     04/07/21  0428 04/07/21  1430 04/08/21  0525 04/08/21  0525 04/08/21  1024 04/09/21  0000 04/09/21  0533   WBC 11.3*  --  13.6*  --   --   --  10.7   RBC 3.87*  --  3.98*  --   --   --  3.55*   HEMOGLOBIN 11.9*   < > 12.1*   < > 12.1* 11.1* 10.9*   HEMATOCRIT 35.8*  --  36.0*  --   --    --  32.5*   PLATELETCT 154*  --  154*  --   --   --  144*    < > = values in this interval not displayed.     Recent Labs     04/07/21  0428 04/07/21  1430 04/08/21  0525 04/08/21  1327 04/08/21  1745 04/09/21  0000 04/09/21  0533   SODIUM 139  --  143  --   --   --  137   POTASSIUM 4.3   < > 4.1   < > 4.0 4.0 4.1   CHLORIDE 111  --  111  --   --   --  107   CO2 20  --  23  --   --   --  26   GLUCOSE 108*  --  135*  --   --   --  122*   BUN 12  --  16  --   --   --  30*   CREATININE 1.12  --  1.14  --   --   --  1.35    < > = values in this interval not displayed.      Recent Labs     04/06/21  1543 04/06/21  2304 04/07/21  0428 04/08/21  0150 04/08/21  0525 04/09/21  0533   ALTSGPT 40  --   --   --   --   --    ASTSGOT 103*  --   --   --   --   --    ALKPHOSPHAT 72  --   --   --   --   --    TBILIRUBIN 0.6  --   --   --   --   --    PREALBUMIN  --   --   --  16.5*  --   --    GLUCOSE 124*   < > 108*  --  135* 122*    < > = values in this interval not displayed.           Lab Results   Component Value Date/Time    CHOLSTRLTOT 130 04/06/2021 11:04 PM    LDL 75 04/06/2021 11:04 PM    HDL 41 04/06/2021 11:04 PM    TRIGLYCERIDE 72 04/08/2021 01:50 AM    TROPONINT 938 (H) 04/06/2021 11:58 AM         Labs reviewed as noted above.  Normal renal function.    Cardiac Imaging and Procedures Review  Telemetry reviewed and showed sinus rhythm.  No arrhythmias.    Assessment/Plan    Coronary artery disease:  Ischemic cardiomyopathy:  VF arrest:  Respiratory failure:  Atrial fibrillation:  GI bleed:    Patient is status post PCI to the LAD and diagonal. He has residual circumflex disease.  Case discussed with Dr. Rosen who recommends outpatient coronary angiography to assess circumflex.  Continue aspirin and Plavix.  Continue Lipitor.    No recurrent VF.  Likely secondary to ischemia.  Continue to monitor on telemetry.    Patient has ischemic cardiomyopathy and is euvolemic on exam.  Continue diuretics per primary team.  We will  start low-dose metoprolol.  Holding off on ACE inhibitor/ARB for now given his borderline blood pressure room.    Patient has not had any recurrent atrial fibrillation.  As discussed previously his atrial fibrillation occurred after his VF arrest.  Possibly secondary to ischemia.  Would recommend outpatient ambulatory monitoring to evaluate for occult atrial fibrillation.  Holding off on triple therapy given his mild GI bleed that occurred after alteplase administration.    Will follow    Thank you for allowing me to participate in the care of this patient. Please do not hesitate to contact me with any questions.    I have performed a physical exam and reviewed and updated ROS as of today, 4/9/2021.  In review of yesterday's note dated, 4/8/2021, there are no changes except as documented above.    Ching Macias MD, [facility]  Cardiologist  [facility]

## 2021-04-09 NOTE — PROGRESS NOTES
[facility] Medicine Daily Progress Note    Date of Service  4/9/2021    Chief Complaint  76 y.o. male admitted 4/6/2021 with VF/VT arrest    Hospital Course  75yo called EMS with c/o CP.  After arrival in ED had a VF/VT arrest with approx 20 mins of CPR and was given tPA for possible PE with ROSC.  Admitted to ICU on vent.  Echo showing wall motion abnormalities and elevated Trop indicating possible primary cardiac event.  Cath lab on 4/6 with PCI of LAD and diagonal with CHARO x 2.  Extubated 4/8    Interval Problem Update  Pt feels anxious, thinks some of the meds are causing him to feel that way.  Denies CP or SOB.    Consultants/Specialty  Cards    Code Status  Full Code    Disposition  OK to Tele    Review of Systems  Review of Systems   Constitutional: Negative for chills and fever.   HENT: Negative for nosebleeds and sore throat.    Eyes: Negative for blurred vision and double vision.   Respiratory: Negative for cough and shortness of breath.    Cardiovascular: Negative for chest pain, palpitations and leg swelling.   Gastrointestinal: Negative for abdominal pain, diarrhea, nausea and vomiting.   Genitourinary: Negative for dysuria and urgency.   Musculoskeletal: Negative for back pain.   Skin: Negative for rash.   Neurological: Negative for dizziness, loss of consciousness and headaches.   Psychiatric/Behavioral: The patient is nervous/anxious.         Physical Exam  Temp:  [36.3 °C (97.4 °F)-37.7 °C (99.9 °F)] 37.5 °C (99.5 °F)  Pulse:  [] 83  Resp:  [10-26] 15  BP: ()/(45-67) 107/53  SpO2:  [83 %-100 %] 93 %    Physical Exam  Vitals reviewed.   Constitutional:       General: He is not in acute distress.     Appearance: Normal appearance. He is well-developed. He is not diaphoretic.   HENT:      Head: Normocephalic and atraumatic.   Eyes:      Conjunctiva/sclera: Conjunctivae normal.   Neck:      Vascular: No JVD.   Cardiovascular:      Rate and Rhythm: Normal rate.      Heart sounds: Murmur  present. No gallop.    Pulmonary:      Effort: Pulmonary effort is normal. No respiratory distress.      Breath sounds: No stridor. No wheezing or rales.   Abdominal:      Palpations: Abdomen is soft.      Tenderness: There is no abdominal tenderness. There is no guarding or rebound.   Musculoskeletal:         General: No tenderness.      Right lower leg: No edema.      Left lower leg: No edema.   Skin:     General: Skin is warm and dry.      Findings: No rash.   Neurological:      General: No focal deficit present.      Mental Status: He is alert and oriented to person, place, and time.   Psychiatric:         Mood and Affect: Mood is anxious.         Thought Content: Thought content normal.         Fluids    Intake/Output Summary (Last 24 hours) at 4/9/2021 1330  Last data filed at 4/9/2021 1022  Gross per 24 hour   Intake 1684.48 ml   Output 1405 ml   Net 279.48 ml       Laboratory  Recent Labs     04/07/21  0428 04/07/21  1430 04/08/21  0525 04/08/21  0525 04/08/21  1024 04/09/21  0000 04/09/21  0533   WBC 11.3*  --  13.6*  --   --   --  10.7   RBC 3.87*  --  3.98*  --   --   --  3.55*   HEMOGLOBIN 11.9*   < > 12.1*   < > 12.1* 11.1* 10.9*   HEMATOCRIT 35.8*  --  36.0*  --   --   --  32.5*   MCV 92.5  --  90.5  --   --   --  91.5   MCH 30.7  --  30.4  --   --   --  30.7   MCHC 33.2*  --  33.6*  --   --   --  33.5*   RDW 49.0  --  46.9  --   --   --  48.4   PLATELETCT 154*  --  154*  --   --   --  144*   MPV 11.5  --  11.2  --   --   --  12.3    < > = values in this interval not displayed.     Recent Labs     04/07/21  0428 04/07/21  1430 04/08/21  0525 04/08/21  1327 04/08/21  1745 04/09/21  0000 04/09/21  0533   SODIUM 139  --  143  --   --   --  137   POTASSIUM 4.3   < > 4.1   < > 4.0 4.0 4.1   CHLORIDE 111  --  111  --   --   --  107   CO2 20  --  23  --   --   --  26   GLUCOSE 108*  --  135*  --   --   --  122*   BUN 12  --  16  --   --   --  30*   CREATININE 1.12  --  1.14  --   --   --  1.35   CALCIUM 8.1*   --  8.4*  --   --   --  8.2*    < > = values in this interval not displayed.             Recent Labs     04/06/21  2304 04/08/21  0150   TRIGLYCERIDE 71 72   HDL 41  --    LDL 75  --        Imaging  DX-ABDOMEN FOR TUBE PLACEMENT   Final Result      Feeding tube tip projects over the expected location of the distal stomach.      DX-ABDOMEN FOR TUBE PLACEMENT   Final Result      1.  Feeding tube tip at the distal esophagus.   2.  Probable hiatal hernia present.      DX-CHEST-PORTABLE (1 VIEW)   Final Result         1.  Pulmonary edema and/or infiltrates are identified, which are stable since the prior exam.   2.  Cardiomegaly   3.  Atherosclerosis      DX-ABDOMEN FOR TUBE PLACEMENT   Final Result      Enteric tube tip projects over the stomach.      DX-CHEST-PORTABLE (1 VIEW)   Final Result         1.  Pulmonary edema and/or infiltrates are identified, which are stable since the prior exam.   2.  Cardiomegaly   3.  Atherosclerosis      DX-CHEST-PORTABLE (1 VIEW)   Final Result      1.  Right internal jugular catheter has been placed and appears appropriately located      2.  No pneumothorax      3.  Pre-existing endotracheal tube and enteric catheter appear appropriately located      4.  Mild atelectasis      CT-CTA CHEST PULMONARY ARTERY W/ RECONS   Final Result      1.  Negative for pulmonary embolism      2.  Mild atelectasis      3.  endotracheal tube and enteric catheter appear appropriately located      4.  Aortic and coronary atherosclerotic plaque            CT-HEAD W/O   Final Result      1.  No acute intracranial abnormality.   2.  Mild cortical atrophy.   3.  Chronic paranasal sinus disease.      EC-ECHOCARDIOGRAM COMPLETE W/ CONT   Final Result      DX-CHEST-PORTABLE (1 VIEW)   Final Result      1.  Endotracheal tube tip 7 cm below the elijah.   2.  Orogastric tube tip at the GE junction level.   3.  No pneumonia or pneumothorax.      CL-LEFT HEART CATHETERIZATION WITH POSSIBLE INTERVENTION    (Results  Pending)        Assessment/Plan  Acute respiratory failure with hypoxia ([facility])  Assessment & Plan  Intubated for airway protection after VF VT arrest  Extubated 4/8  O2/RT protocols  mobilize    Cardiac arrest ([facility])  Assessment & Plan  V. fib VT arrest  Initial echo does show some wall motion abnormalities.  Repeat EKG demonstrates a resolution of the patient's.  Right bundle branch block which was noted on presentation.  CTA chest neg though this was after pt had been given tPA  L heart cath confirms L main disease now s/p PCI and CHARO    Hyperglycemia  Assessment & Plan  A1c 5.5  Likely stress response    Upper gastrointestinal bleed  Assessment & Plan  In setting of VF/VT arrest and tPA administration  Etiology is uncertain.  Intubation was apparently not traumatic.  Bleeding has apparently resolved  Cont po PPI  Is tolerating DAPT for his CAD  Follow daily CBC      AF (atrial fibrillation) ([facility])  Assessment & Plan  In the immediate aftermath of VF/VT arres  Has since been in sinus    NSTEMI (non-ST elevated myocardial infarction) ([facility])  Assessment & Plan  S/p L heart cath and CHARO to LAD and diagonal  DAPT: ASA and clopidogrel  High intensity statin  Start BB and ACEI as pressures allow  LVEF 35% acutely post code    Hypokalemia  Assessment & Plan  Follow and replace daily       VTE prophylaxis: SCDs

## 2021-04-09 NOTE — ASSESSMENT & PLAN NOTE
S/p L heart cath and CHARO to LAD and diagonal  DAPT: ASA and clopidogrel  High intensity statin  Start BB and ACEI as pressures allow  LVEF 35% acutely post code

## 2021-04-09 NOTE — PROGRESS NOTES
Patient suddenly agitated with HR 120s asking for help post lidocaine patch application. Patient states the patch made him feel anxious immediately and removed it himself. Dr. Celestin updated and lidocaine patch order d/c'd and listed as allergy. Patient back to baseline within 15 minutes of removal.

## 2021-04-09 NOTE — CARE PLAN
Problem: Fluid Volume:  Goal: Will maintain balanced intake and output  Outcome: PROGRESSING AS EXPECTED  Intervention: Monitor, educate, and encourage compliance with therapeutic intake of liquids  Note: Now able to take PO liquids     Problem: Pain Management  Goal: Pain level will decrease to patient's comfort goal  Outcome: PROGRESSING AS EXPECTED  Intervention: Educate and implement non-pharmacologic comfort measures. Examples: relaxation, distration, play therapy, activity therapy, massage, etc.  Flowsheets (Taken 4/9/2021 6063)  Intervention:   Medication (see MAR)   Rest   Repositioned   Distraction  Note: Dilaudid given x1; reaction to lidocaine patch - see note.

## 2021-04-10 VITALS
WEIGHT: 170.86 LBS | HEIGHT: 70 IN | DIASTOLIC BLOOD PRESSURE: 76 MMHG | HEART RATE: 95 BPM | SYSTOLIC BLOOD PRESSURE: 141 MMHG | RESPIRATION RATE: 20 BRPM | BODY MASS INDEX: 24.46 KG/M2 | OXYGEN SATURATION: 96 % | TEMPERATURE: 98.7 F

## 2021-04-10 LAB
ANION GAP SERPL CALC-SCNC: 10 MMOL/L (ref 7–16)
BUN SERPL-MCNC: 28 MG/DL (ref 8–22)
CALCIUM SERPL-MCNC: 8.9 MG/DL (ref 8.5–10.5)
CHLORIDE SERPL-SCNC: 106 MMOL/L (ref 96–112)
CO2 SERPL-SCNC: 25 MMOL/L (ref 20–33)
CREAT SERPL-MCNC: 1.11 MG/DL (ref 0.5–1.4)
ERYTHROCYTE [DISTWIDTH] IN BLOOD BY AUTOMATED COUNT: 48.7 FL (ref 35.9–50)
GLUCOSE SERPL-MCNC: 102 MG/DL (ref 65–99)
HCT VFR BLD AUTO: 32.7 % (ref 42–52)
HGB BLD-MCNC: 11 G/DL (ref 14–18)
MCH RBC QN AUTO: 30.8 PG (ref 27–33)
MCHC RBC AUTO-ENTMCNC: 33.6 G/DL (ref 33.7–35.3)
MCV RBC AUTO: 91.6 FL (ref 81.4–97.8)
PLATELET # BLD AUTO: 168 K/UL (ref 164–446)
PMV BLD AUTO: 11.7 FL (ref 9–12.9)
POTASSIUM SERPL-SCNC: 3.8 MMOL/L (ref 3.6–5.5)
RBC # BLD AUTO: 3.57 M/UL (ref 4.7–6.1)
SODIUM SERPL-SCNC: 141 MMOL/L (ref 135–145)
WBC # BLD AUTO: 11 K/UL (ref 4.8–10.8)

## 2021-04-10 PROCEDURE — 85027 COMPLETE CBC AUTOMATED: CPT

## 2021-04-10 PROCEDURE — 700111 HCHG RX REV CODE 636 W/ 250 OVERRIDE (IP): Performed by: INTERNAL MEDICINE

## 2021-04-10 PROCEDURE — 36415 COLL VENOUS BLD VENIPUNCTURE: CPT

## 2021-04-10 PROCEDURE — 80048 BASIC METABOLIC PNL TOTAL CA: CPT

## 2021-04-10 RX ADMIN — FUROSEMIDE 20 MG: 10 INJECTION, SOLUTION INTRAMUSCULAR; INTRAVENOUS at 04:24

## 2021-04-10 ASSESSMENT — COGNITIVE AND FUNCTIONAL STATUS - GENERAL
SUGGESTED CMS G CODE MODIFIER MOBILITY: CJ
WALKING IN HOSPITAL ROOM: A LITTLE
CLIMB 3 TO 5 STEPS WITH RAILING: A LITTLE
STANDING UP FROM CHAIR USING ARMS: A LITTLE
MOBILITY SCORE: 20
SUGGESTED CMS G CODE MODIFIER DAILY ACTIVITY: CJ
DRESSING REGULAR LOWER BODY CLOTHING: A LITTLE
DAILY ACTIVITIY SCORE: 22
TOILETING: A LITTLE
MOVING FROM LYING ON BACK TO SITTING ON SIDE OF FLAT BED: A LITTLE

## 2021-04-10 NOTE — PROGRESS NOTES
2 RN skin check completed with Anjali CHAVEZ. Pt has ecchymosis to left AC, right ac, right radial, and tongue. Removed Right clavicular central line dressing c/d/i, right radial site dressing c/d/i, soft and flat, and laceration noted sublingually. No other skin breakdown noted.

## 2021-04-10 NOTE — PROGRESS NOTES
Ian Condon patient has chosen to leave the hospital against medical advice. The attending physician has not discharged the patient. Patient is not a risk to himself or others. I have discussed with the patient the following:  Physician has not determined patient is ready for discharge, Risks and consequences of leaving the hospital too soon and Benefit of continued hospitalization.        Patient is a greater than 60 years old  and a referral to  was made.    Attending physician has been notified.

## 2021-04-11 LAB
BACTERIA BLD CULT: NORMAL
BACTERIA BLD CULT: NORMAL
SIGNIFICANT IND 70042: NORMAL
SIGNIFICANT IND 70042: NORMAL
SITE SITE: NORMAL
SITE SITE: NORMAL
SOURCE SOURCE: NORMAL
SOURCE SOURCE: NORMAL

## 2021-07-06 ENCOUNTER — TELEPHONE (OUTPATIENT)
Dept: PHYSICAL THERAPY | Facility: MEDICAL CENTER | Age: 76
End: 2021-07-06

## 2021-07-06 NOTE — THERAPY
Attempted to call patient without success due to not having a number listed in medical record. Modified [place] Score 7.